# Patient Record
Sex: MALE | Race: BLACK OR AFRICAN AMERICAN | Employment: FULL TIME | URBAN - METROPOLITAN AREA
[De-identification: names, ages, dates, MRNs, and addresses within clinical notes are randomized per-mention and may not be internally consistent; named-entity substitution may affect disease eponyms.]

---

## 2018-01-11 NOTE — TELEPHONE ENCOUNTER
Pt not seen in years. Made a new pt appt for 1/30/2018. Pt reports asthma has been getting triggered easier and ran out of his rescue inhaler he has had for years wants to know if you can send an inhaler to pharmacy to last him to his appt?  Has not had an

## 2018-01-13 RX ORDER — ALBUTEROL SULFATE 90 UG/1
2 AEROSOL, METERED RESPIRATORY (INHALATION) EVERY 6 HOURS PRN
Qty: 1 INHALER | Refills: 0 | Status: SHIPPED | OUTPATIENT
Start: 2018-01-13 | End: 2021-07-08

## 2018-01-13 NOTE — TELEPHONE ENCOUNTER
Called patient - verified patient's name and  - informed pt of doctor's note - patient states it was an albuterol rescue inhaler.     Med order pended - please advise    Routing to [facility] for [facility]

## 2018-01-29 ENCOUNTER — TELEPHONE (OUTPATIENT)
Dept: FAMILY MEDICINE CLINIC | Facility: CLINIC | Age: 43
End: 2018-01-29

## 2018-01-29 NOTE — TELEPHONE ENCOUNTER
FMLA forms received at the [facility] office via fax for pt. Forms emailed to [email or web address], originals left at the [facility] office for [employer].

## 2018-01-30 ENCOUNTER — OFFICE VISIT (OUTPATIENT)
Dept: FAMILY MEDICINE CLINIC | Facility: CLINIC | Age: 43
End: 2018-01-30

## 2018-01-30 ENCOUNTER — LAB ENCOUNTER (OUTPATIENT)
Dept: LAB | Age: 43
End: 2018-01-30
Attending: FAMILY MEDICINE
Payer: COMMERCIAL

## 2018-01-30 VITALS
SYSTOLIC BLOOD PRESSURE: 127 MMHG | RESPIRATION RATE: 16 BRPM | HEIGHT: 67 IN | WEIGHT: 162 LBS | TEMPERATURE: 98 F | HEART RATE: 67 BPM | DIASTOLIC BLOOD PRESSURE: 73 MMHG | BODY MASS INDEX: 25.43 KG/M2

## 2018-01-30 DIAGNOSIS — Z12.5 PROSTATE CANCER SCREENING: ICD-10-CM

## 2018-01-30 DIAGNOSIS — Z13.220 LIPID SCREENING: ICD-10-CM

## 2018-01-30 DIAGNOSIS — Z11.3 SCREEN FOR STD (SEXUALLY TRANSMITTED DISEASE): ICD-10-CM

## 2018-01-30 DIAGNOSIS — R07.82 INTERCOSTAL PAIN: Primary | ICD-10-CM

## 2018-01-30 DIAGNOSIS — M99.01 CERVICOTHORACIC SOMATIC DYSFUNCTION: ICD-10-CM

## 2018-01-30 DIAGNOSIS — J45.20 MILD INTERMITTENT ASTHMA WITHOUT COMPLICATION: ICD-10-CM

## 2018-01-30 DIAGNOSIS — R07.9 CHEST PAIN, UNSPECIFIED TYPE: ICD-10-CM

## 2018-01-30 DIAGNOSIS — Z00.00 PHYSICAL EXAM, ROUTINE: ICD-10-CM

## 2018-01-30 DIAGNOSIS — Z13.29 THYROID DISORDER SCREEN: ICD-10-CM

## 2018-01-30 PROBLEM — R53.82 CHRONIC FATIGUE: Status: ACTIVE | Noted: 2018-01-30

## 2018-01-30 PROBLEM — R07.89 OTHER CHEST PAIN: Status: ACTIVE | Noted: 2018-01-30

## 2018-01-30 LAB
ALBUMIN SERPL BCP-MCNC: 4.1 G/DL (ref 3.5–4.8)
ALBUMIN/GLOB SERPL: 1.2 {RATIO} (ref 1–2)
ALP SERPL-CCNC: 55 U/L (ref 32–100)
ALT SERPL-CCNC: 22 U/L (ref 17–63)
ANION GAP SERPL CALC-SCNC: 9 MMOL/L (ref 0–18)
AST SERPL-CCNC: 23 U/L (ref 15–41)
BACTERIA UR QL AUTO: NEGATIVE /HPF
BASOPHILS # BLD: 0.1 K/UL (ref 0–0.2)
BASOPHILS NFR BLD: 1 %
BILIRUB SERPL-MCNC: 0.5 MG/DL (ref 0.3–1.2)
BILIRUB UR QL: NEGATIVE
BUN SERPL-MCNC: 9 MG/DL (ref 8–20)
BUN/CREAT SERPL: 9.9 (ref 10–20)
CALCIUM SERPL-MCNC: 9.3 MG/DL (ref 8.5–10.5)
CHLORIDE SERPL-SCNC: 97 MMOL/L (ref 95–110)
CHOLEST SERPL-MCNC: 216 MG/DL (ref 110–200)
CLARITY UR: CLEAR
CO2 SERPL-SCNC: 32 MMOL/L (ref 22–32)
COLOR UR: YELLOW
CREAT SERPL-MCNC: 0.91 MG/DL (ref 0.5–1.5)
EOSINOPHIL # BLD: 0.1 K/UL (ref 0–0.7)
EOSINOPHIL NFR BLD: 2 %
ERYTHROCYTE [DISTWIDTH] IN BLOOD BY AUTOMATED COUNT: 14.9 % (ref 11–15)
GLOBULIN PLAS-MCNC: 3.5 G/DL (ref 2.5–3.7)
GLUCOSE SERPL-MCNC: 76 MG/DL (ref 70–99)
GLUCOSE UR-MCNC: NEGATIVE MG/DL
HCT VFR BLD AUTO: 48.6 % (ref 41–52)
HDLC SERPL-MCNC: 49 MG/DL
HGB BLD-MCNC: 15.7 G/DL (ref 13.5–17.5)
HGB UR QL STRIP.AUTO: NEGATIVE
KETONES UR-MCNC: NEGATIVE MG/DL
LDLC SERPL CALC-MCNC: 142 MG/DL (ref 0–99)
LEUKOCYTE ESTERASE UR QL STRIP.AUTO: NEGATIVE
LYMPHOCYTES # BLD: 2.3 K/UL (ref 1–4)
LYMPHOCYTES NFR BLD: 45 %
MCH RBC QN AUTO: 28.5 PG (ref 27–32)
MCHC RBC AUTO-ENTMCNC: 32.3 G/DL (ref 32–37)
MCV RBC AUTO: 88.3 FL (ref 80–100)
MONOCYTES # BLD: 0.4 K/UL (ref 0–1)
MONOCYTES NFR BLD: 8 %
NEUTROPHILS # BLD AUTO: 2.3 K/UL (ref 1.8–7.7)
NEUTROPHILS NFR BLD: 44 %
NITRITE UR QL STRIP.AUTO: NEGATIVE
NONHDLC SERPL-MCNC: 167 MG/DL
OSMOLALITY UR CALC.SUM OF ELEC: 283 MOSM/KG (ref 275–295)
PH UR: 6 [PH] (ref 5–8)
PLATELET # BLD AUTO: 264 K/UL (ref 140–400)
PMV BLD AUTO: 8.7 FL (ref 7.4–10.3)
POTASSIUM SERPL-SCNC: 3.6 MMOL/L (ref 3.3–5.1)
PROT SERPL-MCNC: 7.6 G/DL (ref 5.9–8.4)
PROT UR-MCNC: NEGATIVE MG/DL
PSA SERPL-MCNC: 1.3 NG/ML (ref 0–4)
RBC # BLD AUTO: 5.51 M/UL (ref 4.5–5.9)
RBC #/AREA URNS AUTO: 1 /HPF
SODIUM SERPL-SCNC: 138 MMOL/L (ref 136–144)
SP GR UR STRIP: 1.01 (ref 1–1.03)
TRIGL SERPL-MCNC: 123 MG/DL (ref 1–149)
TSH SERPL-ACNC: 1.04 UIU/ML (ref 0.45–5.33)
UROBILINOGEN UR STRIP-ACNC: <2
VIT C UR-MCNC: 40 MG/DL
WBC # BLD AUTO: 5.2 K/UL (ref 4–11)
WBC #/AREA URNS AUTO: <1 /HPF

## 2018-01-30 PROCEDURE — 87340 HEPATITIS B SURFACE AG IA: CPT

## 2018-01-30 PROCEDURE — 86803 HEPATITIS C AB TEST: CPT

## 2018-01-30 PROCEDURE — 86704 HEP B CORE ANTIBODY TOTAL: CPT

## 2018-01-30 PROCEDURE — 86780 TREPONEMA PALLIDUM: CPT

## 2018-01-30 PROCEDURE — 86706 HEP B SURFACE ANTIBODY: CPT

## 2018-01-30 PROCEDURE — 99396 PREV VISIT EST AGE 40-64: CPT | Performed by: FAMILY MEDICINE

## 2018-01-30 PROCEDURE — 80061 LIPID PANEL: CPT

## 2018-01-30 PROCEDURE — 85025 COMPLETE CBC W/AUTO DIFF WBC: CPT

## 2018-01-30 PROCEDURE — 84443 ASSAY THYROID STIM HORMONE: CPT

## 2018-01-30 PROCEDURE — 36415 COLL VENOUS BLD VENIPUNCTURE: CPT

## 2018-01-30 PROCEDURE — 98927 OSTEOPATH MANJ 5-6 REGIONS: CPT | Performed by: FAMILY MEDICINE

## 2018-01-30 PROCEDURE — 81003 URINALYSIS AUTO W/O SCOPE: CPT

## 2018-01-30 PROCEDURE — 80500 HEPATITIS A B + C PROFILE: CPT

## 2018-01-30 PROCEDURE — 87591 N.GONORRHOEAE DNA AMP PROB: CPT

## 2018-01-30 PROCEDURE — 99214 OFFICE O/P EST MOD 30 MIN: CPT | Performed by: FAMILY MEDICINE

## 2018-01-30 PROCEDURE — 87389 HIV-1 AG W/HIV-1&-2 AB AG IA: CPT

## 2018-01-30 PROCEDURE — 87491 CHLMYD TRACH DNA AMP PROBE: CPT

## 2018-01-30 PROCEDURE — 80053 COMPREHEN METABOLIC PANEL: CPT

## 2018-01-30 PROCEDURE — 93005 ELECTROCARDIOGRAM TRACING: CPT | Performed by: FAMILY MEDICINE

## 2018-01-30 PROCEDURE — 86708 HEPATITIS A ANTIBODY: CPT

## 2018-01-30 PROCEDURE — 93000 ELECTROCARDIOGRAM COMPLETE: CPT | Performed by: FAMILY MEDICINE

## 2018-01-30 NOTE — PATIENT INSTRUCTIONS
OMT done. Consider EKG. Continue with rescue inhaler as needed. Upper body stretches recommended. Patient counseled on the importance of abstinence and if sex occurs of any type condoms should be used every single time.  The reality of unwanted pregnanc

## 2018-01-31 ENCOUNTER — TELEPHONE (OUTPATIENT)
Dept: OTHER | Age: 43
End: 2018-01-31

## 2018-01-31 ENCOUNTER — TELEPHONE (OUTPATIENT)
Dept: FAMILY MEDICINE CLINIC | Facility: CLINIC | Age: 43
End: 2018-01-31

## 2018-01-31 DIAGNOSIS — J45.20 MILD INTERMITTENT ASTHMA WITHOUT COMPLICATION: Primary | ICD-10-CM

## 2018-01-31 DIAGNOSIS — E78.5 DYSLIPIDEMIA: ICD-10-CM

## 2018-01-31 LAB
C TRACH DNA SPEC QL NAA+PROBE: NEGATIVE
HAV AB SER QL IA: NONREACTIVE
HBV CORE AB SERPL QL IA: NONREACTIVE
HBV SURFACE AB SER-ACNC: <3.1 MIU/ML (ref ?–10)
HBV SURFACE AG SERPL QL IA: NONREACTIVE
HBV SURFACE AG SERPL QL IA: NONREACTIVE
HCV AB SERPL QL IA: NONREACTIVE
HIV1+2 AB SERPL QL IA: NONREACTIVE
N GONORRHOEA DNA SPEC QL NAA+PROBE: NEGATIVE
T PALLIDUM AB SER QL: NEGATIVE

## 2018-01-31 NOTE — TELEPHONE ENCOUNTER
Pt was seen yesterday by [facility] and states was advised to call back if no improvement in \"breathing restriction. \" Pt states still having intermittent wheezing (not present currently), intermittent \"breathing restriction\" (hx of asthma; more so with Darnell Tejada

## 2018-01-31 NOTE — PROGRESS NOTES
HPI:    Patient ID: Mayte Matthews is a 40year old male.     40year old AA male here for complete preventive care physical and for status update on any confirmed chronic medical illnesses and follow up on any previous labs or procedures that were suggestive index is 25.37 kg/m². PHYSICAL EXAM:   Physical Exam    Constitutional: He is oriented to person, place, and time. He appears well-developed and well-nourished. He appears distressed.    Patient seems quietly anxious and apprehensive   HENT:   Right Ear: screen  Screened  - TSH W REFLEX TO FREE T4; Future      Orders Placed This Encounter      HIV AG AB Combo [E]      Hepatitis A B + C profile [E]      T Pallidum Screening [place] TREP [E]      CBC W Differential W Platelet [E]      Comp Metabolic Panel (1

## 2018-01-31 NOTE — TELEPHONE ENCOUNTER
FMLA form for Dr. Fatoumata Thomas received in [address]. Logged for processing. [employer] packet emailed to pt '[email or web address]'.  NK

## 2018-01-31 NOTE — TELEPHONE ENCOUNTER
Called patient - verified patient's name and  - informed pt of doctor's note, answered pt's questions, provided contact info for referral and lab for CXR - patient verbalized understanding  Pt asked about lab results from yesterday - advised pt of Dr Rishabh Meza

## 2018-01-31 NOTE — TELEPHONE ENCOUNTER
Pt states first appt with Dr Pieter Villeda not until  3/12  Dr Alycia Montgomery opening not until 2/23       Pt asking if Dr Rosio Hastings can assist with getting sooner appt?  or is it ok to wait?     Darshana Presley he is only off work until 2/12

## 2018-02-02 ENCOUNTER — HOSPITAL ENCOUNTER (OUTPATIENT)
Dept: GENERAL RADIOLOGY | Age: 43
Discharge: HOME | End: 2018-02-02
Attending: FAMILY MEDICINE
Payer: COMMERCIAL

## 2018-02-02 DIAGNOSIS — J45.20 MILD INTERMITTENT ASTHMA WITHOUT COMPLICATION: ICD-10-CM

## 2018-02-02 PROCEDURE — 71046 X-RAY EXAM CHEST 2 VIEWS: CPT | Performed by: FAMILY MEDICINE

## 2018-02-05 ENCOUNTER — TELEPHONE (OUTPATIENT)
Dept: OTHER | Age: 43
End: 2018-02-05

## 2018-02-05 DIAGNOSIS — M47.814 SPONDYLOSIS OF THORACIC REGION WITHOUT MYELOPATHY OR RADICULOPATHY: Primary | ICD-10-CM

## 2018-02-05 NOTE — TELEPHONE ENCOUNTER
I already stated that this is likely noncontributory, however to see that all the way through I put an ortho/spine referral on chart.

## 2018-02-05 NOTE — TELEPHONE ENCOUNTER
Dr. Hien Sosa: please review and advise, patient called and had concerns regarding his CXR (T10-T11) compression deformities that were found. He asked how serious is this, and is there anything he needs to do to f/u with this issue?   He recalls last year h

## 2018-02-07 NOTE — TELEPHONE ENCOUNTER
Please note/advise. Thank you.  Please reply to pool: EM RN TRIAGE    Pt contacted (Name and  verified) and informed of MD message below and the referral information was provided but pt states that he saw the message via [employer] and has an OV appt on

## 2018-02-07 NOTE — TELEPHONE ENCOUNTER
Dr. Carlos Trinidad,  Patient dropped off disability form. Has been off work since 12-26-17. Do you support this and also pt still off work. Please advise.     Phu Messer ([employer])

## 2018-02-08 NOTE — TELEPHONE ENCOUNTER
Pulmonology staff: please see Dr Hattie Neal note. Assist patient in getting sooner appt if possible.

## 2018-02-08 NOTE — TELEPHONE ENCOUNTER
Sorry I forgot to speak with you the other day. I just don't remember him telling me he had been off that long. I honestly don't know what to say about this. He still has specialists to see. Is he back @ work. If he is I'll approve it, but if he is not then he needs to come back in and see me sooner than later.

## 2018-02-08 NOTE — TELEPHONE ENCOUNTER
Pt stts he has not seen any Pulmonologist and needs to be seen for pain w/ deep inhalation & exhalation. He declined appt on 2/14. Sched pt on 2/16 @ 4 pm [facility] w[facility] [address] [facility]. He was given appt time & location. Pt verbalized understanding.  Dr. Abelardo Reyes-

## 2018-02-09 NOTE — TELEPHONE ENCOUNTER
Brief conversation with pt he stated he is still not feeling well enough to return to work. Pt made appt to talk to you 2-23-18. No addl action at this time I will hold onto the form.     Claire Swift

## 2018-02-21 ENCOUNTER — OFFICE VISIT (OUTPATIENT)
Dept: ORTHOPEDICS CLINIC | Facility: CLINIC | Age: 43
End: 2018-02-21

## 2018-02-21 DIAGNOSIS — S29.019A THORACIC MYOFASCIAL STRAIN, INITIAL ENCOUNTER: Primary | ICD-10-CM

## 2018-02-21 PROCEDURE — 99212 OFFICE O/P EST SF 10 MIN: CPT | Performed by: ORTHOPAEDIC SURGERY

## 2018-02-21 PROCEDURE — 99244 OFF/OP CNSLTJ NEW/EST MOD 40: CPT | Performed by: ORTHOPAEDIC SURGERY

## 2018-02-21 NOTE — PROGRESS NOTES
[address], [address]    Patient: 2000 [facility] Record Number: [id number]  Site: [address] [facility], [address] [place] [address],[address], [address]  Referring Physician:  Faisal Ozuna  [facility] VAS Pain Score: 4 /10    Aggravating Factors: Relieving Factors:   · Sitting  · Standing  · walking  · Car rides  · bending · Sitting  · Laying down     Past Treatment Attempted/Patient’s Response:    The patient indicates he requires assistance with the f PSYCHE: n depression, n anxiety. HEMATOLOGY: denies hx anemia; denies bruising or excessive bleeding. ENDOCRINE: denies excessive thirst or urination; denies unexpected wt gain or wt loss.   MUSCULOSKELETAL: DENIES:, Muscle cramps, Joint pain, Swelling of HEAD/NECK: Head is normocephalic  EYES: EOMI, AYAZ  SKIN EXAM: Skin is intact, head, neck, trunk and arms/legs. No rashes, mottling or ulcerations. LYMPH EXAM: There is no lymph edema in either lower extremity.   VASCULAR EXAM:  pulses are normal bilate

## 2018-03-09 ENCOUNTER — OFFICE VISIT (OUTPATIENT)
Dept: PULMONOLOGY | Facility: CLINIC | Age: 43
End: 2018-03-09

## 2018-03-09 VITALS
HEIGHT: 67 IN | SYSTOLIC BLOOD PRESSURE: 117 MMHG | HEART RATE: 76 BPM | WEIGHT: 163.38 LBS | OXYGEN SATURATION: 98 % | DIASTOLIC BLOOD PRESSURE: 78 MMHG | RESPIRATION RATE: 18 BRPM | BODY MASS INDEX: 25.64 KG/M2

## 2018-03-09 DIAGNOSIS — J45.30 MILD PERSISTENT ASTHMA WITHOUT COMPLICATION: Primary | ICD-10-CM

## 2018-03-09 PROCEDURE — 99212 OFFICE O/P EST SF 10 MIN: CPT | Performed by: INTERNAL MEDICINE

## 2018-03-09 PROCEDURE — 99244 OFF/OP CNSLTJ NEW/EST MOD 40: CPT | Performed by: INTERNAL MEDICINE

## 2018-03-09 NOTE — PROGRESS NOTES
[address], [place]    Report of Consultation    Caffie Course Patient Status:  Outpatient    1975 MRN [id number]   Location [place] , [address] [facility], [address] Attending No att. providers found   Hosp Day # 0 PCP Westley Thalia Flor CTA(B)     Slightly distant breath sounds but clear     No rales or rhonchi or wheezes     Heart RRR no G/M   abd benign   Ext normal   No focal deficit   Skin normal     Results:     Laboratory Data:    Lab Results  Component Value Date   WBC 5.2 01/30/20

## 2018-03-14 ENCOUNTER — HOSPITAL ENCOUNTER (OUTPATIENT)
Dept: RESPIRATORY THERAPY | Facility: HOSPITAL | Age: 43
Discharge: HOME | End: 2018-03-14
Attending: INTERNAL MEDICINE
Payer: COMMERCIAL

## 2018-03-14 DIAGNOSIS — J45.30 MILD PERSISTENT ASTHMA WITHOUT COMPLICATION: ICD-10-CM

## 2018-03-14 PROCEDURE — 94729 DIFFUSING CAPACITY: CPT | Performed by: INTERNAL MEDICINE

## 2018-03-14 PROCEDURE — 94726 PLETHYSMOGRAPHY LUNG VOLUMES: CPT | Performed by: INTERNAL MEDICINE

## 2018-03-14 PROCEDURE — 94060 EVALUATION OF WHEEZING: CPT | Performed by: INTERNAL MEDICINE

## 2018-03-16 NOTE — PROCEDURES
[facility]    Patient's Name William Smith MRN [id number]    1975 Pulmonologist Nelson Dinero MD   Location [address] [facility] PCP Lawyer Billy DO     IMPRESSION:    The PFTs are Normal.    No change in jesus

## 2018-03-22 ENCOUNTER — TELEPHONE (OUTPATIENT)
Dept: ADMINISTRATIVE | Age: 43
End: 2018-03-22

## 2018-03-22 NOTE — TELEPHONE ENCOUNTER
Good morning Dr. Angela Thornton,   Pt was off work as of December due to intercostal pain. Filing for retro disability and FMLA. I did not see that you provided him with a note or medical leave. 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

## 2018-03-27 NOTE — TELEPHONE ENCOUNTER
His PFTs is normal  Patient was seen by me first time in office his physical exam was unremarkable  Even though he had asthma, I would not be able to help him on disability paperwork   Thank you

## 2018-04-09 NOTE — TELEPHONE ENCOUNTER
Forms to be scanned not completed.   Disability not approved by providers:   Dr. Fatoumata Thomas or Dr. Abhishek Huitron

## 2020-07-07 ENCOUNTER — OFFICE VISIT (OUTPATIENT)
Dept: FAMILY MEDICINE CLINIC | Facility: CLINIC | Age: 45
End: 2020-07-07
Payer: MEDICAID

## 2020-07-07 ENCOUNTER — LAB ENCOUNTER (OUTPATIENT)
Dept: LAB | Age: 45
End: 2020-07-07
Attending: FAMILY MEDICINE
Payer: MEDICAID

## 2020-07-07 VITALS
SYSTOLIC BLOOD PRESSURE: 128 MMHG | HEIGHT: 67 IN | DIASTOLIC BLOOD PRESSURE: 83 MMHG | WEIGHT: 172 LBS | RESPIRATION RATE: 17 BRPM | BODY MASS INDEX: 27 KG/M2 | HEART RATE: 79 BPM

## 2020-07-07 DIAGNOSIS — Z12.5 PROSTATE CANCER SCREENING: ICD-10-CM

## 2020-07-07 DIAGNOSIS — Z00.00 ENCOUNTER FOR PREVENTIVE CARE: ICD-10-CM

## 2020-07-07 DIAGNOSIS — J45.40 MODERATE PERSISTENT ASTHMA WITHOUT COMPLICATION: ICD-10-CM

## 2020-07-07 DIAGNOSIS — Z11.3 ROUTINE SCREENING FOR STI (SEXUALLY TRANSMITTED INFECTION): ICD-10-CM

## 2020-07-07 DIAGNOSIS — Z13.220 LIPID SCREENING: ICD-10-CM

## 2020-07-07 DIAGNOSIS — Z13.29 THYROID DISORDER SCREEN: ICD-10-CM

## 2020-07-07 DIAGNOSIS — R68.82 DECREASED LIBIDO: ICD-10-CM

## 2020-07-07 DIAGNOSIS — Z00.00 ENCOUNTER FOR PREVENTIVE CARE: Primary | ICD-10-CM

## 2020-07-07 DIAGNOSIS — E55.9 VITAMIN D INSUFFICIENCY: ICD-10-CM

## 2020-07-07 DIAGNOSIS — R94.31 ABNORMAL FINDING ON EKG: ICD-10-CM

## 2020-07-07 LAB
ALBUMIN SERPL-MCNC: 4.1 G/DL (ref 3.4–5)
ALBUMIN/GLOB SERPL: 1 {RATIO} (ref 1–2)
ALP LIVER SERPL-CCNC: 58 U/L (ref 45–117)
ALT SERPL-CCNC: 36 U/L (ref 16–61)
ANION GAP SERPL CALC-SCNC: 5 MMOL/L (ref 0–18)
AST SERPL-CCNC: 27 U/L (ref 15–37)
BACTERIA UR QL AUTO: NEGATIVE /HPF
BASOPHILS # BLD AUTO: 0.02 X10(3) UL (ref 0–0.2)
BASOPHILS NFR BLD AUTO: 0.4 %
BILIRUB SERPL-MCNC: 0.5 MG/DL (ref 0.1–2)
BILIRUB UR QL: NEGATIVE
BUN BLD-MCNC: 16 MG/DL (ref 7–18)
BUN/CREAT SERPL: 14.2 (ref 10–20)
CALCIUM BLD-MCNC: 9.5 MG/DL (ref 8.5–10.1)
CHLORIDE SERPL-SCNC: 104 MMOL/L (ref 98–112)
CHOLEST SMN-MCNC: 228 MG/DL (ref ?–200)
CLARITY UR: CLEAR
CO2 SERPL-SCNC: 32 MMOL/L (ref 21–32)
COLOR UR: YELLOW
COMPLEXED PSA SERPL-MCNC: 1.33 NG/ML (ref ?–4)
CREAT BLD-MCNC: 1.13 MG/DL (ref 0.7–1.3)
DEPRECATED RDW RBC AUTO: 45.6 FL (ref 35.1–46.3)
EOSINOPHIL # BLD AUTO: 0.13 X10(3) UL (ref 0–0.7)
EOSINOPHIL NFR BLD AUTO: 2.7 %
ERYTHROCYTE [DISTWIDTH] IN BLOOD BY AUTOMATED COUNT: 14.2 % (ref 11–15)
GLOBULIN PLAS-MCNC: 4 G/DL (ref 2.8–4.4)
GLUCOSE BLD-MCNC: 72 MG/DL (ref 70–99)
GLUCOSE UR-MCNC: NEGATIVE MG/DL
HAV AB SER QL IA: NONREACTIVE
HBV CORE AB SERPL QL IA: NONREACTIVE
HBV SURFACE AB SER QL: NONREACTIVE
HBV SURFACE AB SERPL IA-ACNC: <3.1 MIU/ML
HBV SURFACE AG SERPL QL IA: NONREACTIVE
HCT VFR BLD AUTO: 47.8 % (ref 39–53)
HCV AB SERPL QL IA: NONREACTIVE
HDLC SERPL-MCNC: 59 MG/DL (ref 40–59)
HGB BLD-MCNC: 16 G/DL (ref 13–17.5)
HGB UR QL STRIP.AUTO: NEGATIVE
IMM GRANULOCYTES # BLD AUTO: 0.01 X10(3) UL (ref 0–1)
IMM GRANULOCYTES NFR BLD: 0.2 %
KETONES UR-MCNC: NEGATIVE MG/DL
LDLC SERPL CALC-MCNC: 147 MG/DL (ref ?–100)
LEUKOCYTE ESTERASE UR QL STRIP.AUTO: NEGATIVE
LYMPHOCYTES # BLD AUTO: 2.22 X10(3) UL (ref 1–4)
LYMPHOCYTES NFR BLD AUTO: 45.7 %
M PROTEIN MFR SERPL ELPH: 8.1 G/DL (ref 6.4–8.2)
MCH RBC QN AUTO: 29.3 PG (ref 26–34)
MCHC RBC AUTO-ENTMCNC: 33.5 G/DL (ref 31–37)
MCV RBC AUTO: 87.5 FL (ref 80–100)
MONOCYTES # BLD AUTO: 0.41 X10(3) UL (ref 0.1–1)
MONOCYTES NFR BLD AUTO: 8.4 %
NEUTROPHILS # BLD AUTO: 2.07 X10 (3) UL (ref 1.5–7.7)
NEUTROPHILS # BLD AUTO: 2.07 X10(3) UL (ref 1.5–7.7)
NEUTROPHILS NFR BLD AUTO: 42.6 %
NITRITE UR QL STRIP.AUTO: NEGATIVE
NONHDLC SERPL-MCNC: 169 MG/DL (ref ?–130)
OSMOLALITY SERPL CALC.SUM OF ELEC: 292 MOSM/KG (ref 275–295)
PATIENT FASTING Y/N/NP: NO
PATIENT FASTING Y/N/NP: NO
PH UR: 5 [PH] (ref 5–8)
PLATELET # BLD AUTO: 298 10(3)UL (ref 150–450)
POTASSIUM SERPL-SCNC: 3.9 MMOL/L (ref 3.5–5.1)
PROT UR-MCNC: NEGATIVE MG/DL
RBC # BLD AUTO: 5.46 X10(6)UL (ref 4.3–5.7)
RBC #/AREA URNS AUTO: 1 /HPF
SODIUM SERPL-SCNC: 141 MMOL/L (ref 136–145)
SP GR UR STRIP: 1.03 (ref 1–1.03)
TRIGL SERPL-MCNC: 110 MG/DL (ref 30–149)
TSI SER-ACNC: 0.88 MIU/ML (ref 0.36–3.74)
UROBILINOGEN UR STRIP-ACNC: <2
VLDLC SERPL CALC-MCNC: 22 MG/DL (ref 0–30)
WBC # BLD AUTO: 4.9 X10(3) UL (ref 4–11)
WBC #/AREA URNS AUTO: 1 /HPF

## 2020-07-07 PROCEDURE — 86706 HEP B SURFACE ANTIBODY: CPT

## 2020-07-07 PROCEDURE — 87340 HEPATITIS B SURFACE AG IA: CPT

## 2020-07-07 PROCEDURE — 86780 TREPONEMA PALLIDUM: CPT

## 2020-07-07 PROCEDURE — 93000 ELECTROCARDIOGRAM COMPLETE: CPT | Performed by: FAMILY MEDICINE

## 2020-07-07 PROCEDURE — 82306 VITAMIN D 25 HYDROXY: CPT

## 2020-07-07 PROCEDURE — 80500 HEPATITIS A B + C PROFILE: CPT

## 2020-07-07 PROCEDURE — 81001 URINALYSIS AUTO W/SCOPE: CPT

## 2020-07-07 PROCEDURE — 87491 CHLMYD TRACH DNA AMP PROBE: CPT

## 2020-07-07 PROCEDURE — 87389 HIV-1 AG W/HIV-1&-2 AB AG IA: CPT

## 2020-07-07 PROCEDURE — 36415 COLL VENOUS BLD VENIPUNCTURE: CPT

## 2020-07-07 PROCEDURE — 84402 ASSAY OF FREE TESTOSTERONE: CPT

## 2020-07-07 PROCEDURE — 86803 HEPATITIS C AB TEST: CPT

## 2020-07-07 PROCEDURE — 80053 COMPREHEN METABOLIC PANEL: CPT

## 2020-07-07 PROCEDURE — 99396 PREV VISIT EST AGE 40-64: CPT | Performed by: FAMILY MEDICINE

## 2020-07-07 PROCEDURE — 86708 HEPATITIS A ANTIBODY: CPT

## 2020-07-07 PROCEDURE — 85025 COMPLETE CBC W/AUTO DIFF WBC: CPT

## 2020-07-07 PROCEDURE — 99214 OFFICE O/P EST MOD 30 MIN: CPT | Performed by: FAMILY MEDICINE

## 2020-07-07 PROCEDURE — 87591 N.GONORRHOEAE DNA AMP PROB: CPT

## 2020-07-07 PROCEDURE — 86704 HEP B CORE ANTIBODY TOTAL: CPT

## 2020-07-07 PROCEDURE — 84443 ASSAY THYROID STIM HORMONE: CPT

## 2020-07-07 PROCEDURE — 84403 ASSAY OF TOTAL TESTOSTERONE: CPT

## 2020-07-07 PROCEDURE — 80061 LIPID PANEL: CPT

## 2020-07-07 RX ORDER — ALBUTEROL SULFATE 2.5 MG/3ML
2.5 SOLUTION RESPIRATORY (INHALATION) EVERY 4 HOURS PRN
Qty: 75 ML | Refills: 3 | Status: SHIPPED | OUTPATIENT
Start: 2020-07-07 | End: 2021-07-07

## 2020-07-07 RX ORDER — ALBUTEROL SULFATE 90 UG/1
2 AEROSOL, METERED RESPIRATORY (INHALATION) EVERY 6 HOURS PRN
Qty: 2 INHALER | Refills: 2 | Status: SHIPPED | OUTPATIENT
Start: 2020-07-07

## 2020-07-07 NOTE — PATIENT INSTRUCTIONS
All adult screening ordered and done appropriate for patient's age and gender and risk factors and complaints. Patient counseled on the importance of abstinence and if sex occurs of any type condoms should be used every single time.  The reality of unwante

## 2020-07-07 NOTE — PROGRESS NOTES
HPI:    Patient ID: Rosamaria Harrison is a 41year old male.     This patient is a 50-year-old -American gentleman here for complete preventive care physical and for status update on any confirmed chronic medical illnesses and follow up on any previous la Tympanic membrane and ear canal normal.   Left Ear: Tympanic membrane and ear canal normal.   Nose: Nose normal.   Mouth/Throat: Oropharynx is clear and moist.   Neck: No thyromegaly present. Cardiovascular: Normal rate and regular rhythm.     Pulmonary/C Refill: 3  - EXT DME NEBULIZER     8. Vitamin D insufficiency  Ordered. Vitamin D3 supplementation recommended to the patient.   - VITAMIN D, 25-HYDROXY; Future    Orders Placed This Encounter      Lipid Panel [E]      CBC W Differential W Platelet [E]

## 2020-07-08 LAB
25(OH)D3 SERPL-MCNC: 37.4 NG/ML (ref 30–100)
C TRACH DNA SPEC QL NAA+PROBE: NEGATIVE
N GONORRHOEA DNA SPEC QL NAA+PROBE: NEGATIVE
T PALLIDUM AB SER QL: NEGATIVE

## 2020-07-19 LAB
TESTOSTERONE, FREE, S: 7.56 NG/DL
TESTOSTERONE, TOTAL, S: 540 NG/DL

## 2020-07-28 ENCOUNTER — TELEPHONE (OUTPATIENT)
Dept: FAMILY MEDICINE CLINIC | Facility: CLINIC | Age: 45
End: 2020-07-28

## 2021-07-08 ENCOUNTER — OFFICE VISIT (OUTPATIENT)
Dept: FAMILY MEDICINE CLINIC | Facility: CLINIC | Age: 46
End: 2021-07-08
Payer: MEDICAID

## 2021-07-08 ENCOUNTER — LAB ENCOUNTER (OUTPATIENT)
Dept: LAB | Age: 46
End: 2021-07-08
Attending: FAMILY MEDICINE
Payer: MEDICAID

## 2021-07-08 VITALS
HEIGHT: 67 IN | WEIGHT: 174 LBS | DIASTOLIC BLOOD PRESSURE: 74 MMHG | TEMPERATURE: 97 F | RESPIRATION RATE: 17 BRPM | BODY MASS INDEX: 27.31 KG/M2 | HEART RATE: 78 BPM | SYSTOLIC BLOOD PRESSURE: 112 MMHG

## 2021-07-08 DIAGNOSIS — Z00.00 ROUTINE PHYSICAL EXAMINATION: Primary | ICD-10-CM

## 2021-07-08 DIAGNOSIS — Z00.00 ROUTINE PHYSICAL EXAMINATION: ICD-10-CM

## 2021-07-08 LAB
ALBUMIN SERPL-MCNC: 3.7 G/DL (ref 3.4–5)
ALBUMIN/GLOB SERPL: 0.9 {RATIO} (ref 1–2)
ALP LIVER SERPL-CCNC: 60 U/L
ALT SERPL-CCNC: 20 U/L
ANION GAP SERPL CALC-SCNC: 5 MMOL/L (ref 0–18)
AST SERPL-CCNC: 16 U/L (ref 15–37)
BASOPHILS # BLD AUTO: 0.03 X10(3) UL (ref 0–0.2)
BASOPHILS NFR BLD AUTO: 0.6 %
BILIRUB SERPL-MCNC: 0.4 MG/DL (ref 0.1–2)
BILIRUB UR QL: NEGATIVE
BUN BLD-MCNC: 13 MG/DL (ref 7–18)
BUN/CREAT SERPL: 13.8 (ref 10–20)
CALCIUM BLD-MCNC: 9.4 MG/DL (ref 8.5–10.1)
CHLORIDE SERPL-SCNC: 107 MMOL/L (ref 98–112)
CHOLEST SMN-MCNC: 222 MG/DL (ref ?–200)
CLARITY UR: CLEAR
CO2 SERPL-SCNC: 29 MMOL/L (ref 21–32)
COLOR UR: YELLOW
COMPLEXED PSA SERPL-MCNC: 1.03 NG/ML (ref ?–4)
CREAT BLD-MCNC: 0.94 MG/DL
DEPRECATED RDW RBC AUTO: 45.5 FL (ref 35.1–46.3)
EOSINOPHIL # BLD AUTO: 0.17 X10(3) UL (ref 0–0.7)
EOSINOPHIL NFR BLD AUTO: 3.6 %
ERYTHROCYTE [DISTWIDTH] IN BLOOD BY AUTOMATED COUNT: 14.1 % (ref 11–15)
GLOBULIN PLAS-MCNC: 3.9 G/DL (ref 2.8–4.4)
GLUCOSE BLD-MCNC: 81 MG/DL (ref 70–99)
GLUCOSE UR-MCNC: NEGATIVE MG/DL
HCT VFR BLD AUTO: 47.3 %
HDLC SERPL-MCNC: 51 MG/DL (ref 40–59)
HGB BLD-MCNC: 15.4 G/DL
HGB UR QL STRIP.AUTO: NEGATIVE
IMM GRANULOCYTES # BLD AUTO: 0 X10(3) UL (ref 0–1)
IMM GRANULOCYTES NFR BLD: 0 %
KETONES UR-MCNC: NEGATIVE MG/DL
LDLC SERPL CALC-MCNC: 156 MG/DL (ref ?–100)
LEUKOCYTE ESTERASE UR QL STRIP.AUTO: NEGATIVE
LYMPHOCYTES # BLD AUTO: 2.44 X10(3) UL (ref 1–4)
LYMPHOCYTES NFR BLD AUTO: 51.4 %
M PROTEIN MFR SERPL ELPH: 7.6 G/DL (ref 6.4–8.2)
MCH RBC QN AUTO: 28.6 PG (ref 26–34)
MCHC RBC AUTO-ENTMCNC: 32.6 G/DL (ref 31–37)
MCV RBC AUTO: 87.9 FL
MONOCYTES # BLD AUTO: 0.34 X10(3) UL (ref 0.1–1)
MONOCYTES NFR BLD AUTO: 7.2 %
NEUTROPHILS # BLD AUTO: 1.77 X10 (3) UL (ref 1.5–7.7)
NEUTROPHILS # BLD AUTO: 1.77 X10(3) UL (ref 1.5–7.7)
NEUTROPHILS NFR BLD AUTO: 37.2 %
NITRITE UR QL STRIP.AUTO: NEGATIVE
NONHDLC SERPL-MCNC: 171 MG/DL (ref ?–130)
OSMOLALITY SERPL CALC.SUM OF ELEC: 291 MOSM/KG (ref 275–295)
PATIENT FASTING Y/N/NP: NO
PATIENT FASTING Y/N/NP: NO
PH UR: 6 [PH] (ref 5–8)
PLATELET # BLD AUTO: 266 10(3)UL (ref 150–450)
POTASSIUM SERPL-SCNC: 4.4 MMOL/L (ref 3.5–5.1)
PROT UR-MCNC: NEGATIVE MG/DL
RBC # BLD AUTO: 5.38 X10(6)UL
SODIUM SERPL-SCNC: 141 MMOL/L (ref 136–145)
SP GR UR STRIP: 1.02 (ref 1–1.03)
TRIGL SERPL-MCNC: 84 MG/DL (ref 30–149)
TSI SER-ACNC: 0.5 MIU/ML (ref 0.36–3.74)
UROBILINOGEN UR STRIP-ACNC: <2
VLDLC SERPL CALC-MCNC: 16 MG/DL (ref 0–30)
WBC # BLD AUTO: 4.8 X10(3) UL (ref 4–11)

## 2021-07-08 PROCEDURE — 3074F SYST BP LT 130 MM HG: CPT | Performed by: FAMILY MEDICINE

## 2021-07-08 PROCEDURE — 3008F BODY MASS INDEX DOCD: CPT | Performed by: FAMILY MEDICINE

## 2021-07-08 PROCEDURE — 85025 COMPLETE CBC W/AUTO DIFF WBC: CPT

## 2021-07-08 PROCEDURE — 81003 URINALYSIS AUTO W/O SCOPE: CPT

## 2021-07-08 PROCEDURE — 3078F DIAST BP <80 MM HG: CPT | Performed by: FAMILY MEDICINE

## 2021-07-08 PROCEDURE — 99396 PREV VISIT EST AGE 40-64: CPT | Performed by: FAMILY MEDICINE

## 2021-07-08 PROCEDURE — 80061 LIPID PANEL: CPT

## 2021-07-08 PROCEDURE — 80053 COMPREHEN METABOLIC PANEL: CPT

## 2021-07-08 PROCEDURE — 84443 ASSAY THYROID STIM HORMONE: CPT

## 2021-07-08 PROCEDURE — 36415 COLL VENOUS BLD VENIPUNCTURE: CPT

## 2021-07-08 NOTE — PATIENT INSTRUCTIONS
All adult screening ordered and done appropriate for patient's age and gender and risk factors and complaints. Encouraged physical fitness and daily physical activity daily. Monitor blood pressures and record at home. Limit salt intake.

## 2021-07-08 NOTE — PROGRESS NOTES
HPI/Subjective:   Patient ID: Dina Tristan is a 40year old male.     This is a 56-year-old -American male here for complete preventive care physical and for status update on any confirmed chronic medical illnesses and follow up on any previous labs examination  General well exam the following have been ordered. Echocardiogram order secondary to findings on previously done EKG. - CARD ECHO 2D DOPPLER CONTRAST (CPT=93306); Future  - LIPID PANEL;  Future  - TSH W REFLEX TO FREE T4; Future  - COMP METAB

## 2021-08-03 ENCOUNTER — HOSPITAL ENCOUNTER (OUTPATIENT)
Dept: CV DIAGNOSTICS | Facility: HOSPITAL | Age: 46
Discharge: HOME | End: 2021-08-03
Attending: FAMILY MEDICINE
Payer: MEDICAID

## 2021-08-03 DIAGNOSIS — Z00.00 ROUTINE PHYSICAL EXAMINATION: ICD-10-CM

## 2021-08-03 PROCEDURE — 93306 TTE W/DOPPLER COMPLETE: CPT | Performed by: FAMILY MEDICINE

## 2021-08-04 ENCOUNTER — PATIENT MESSAGE (OUTPATIENT)
Dept: FAMILY MEDICINE CLINIC | Facility: CLINIC | Age: 46
End: 2021-08-04

## 2021-08-04 DIAGNOSIS — E78.00 ELEVATED CHOLESTEROL: Primary | ICD-10-CM

## 2021-08-04 NOTE — TELEPHONE ENCOUNTER
From: Toro Arriaga  To: Army Makayla DO  Sent: 8/4/2021 11:25 AM CDT  Subject: Test Results Question    Hi, I don’t see the results for my blood work. Please let me know if this information is available. Thank you!

## 2021-11-11 ENCOUNTER — OFFICE VISIT (OUTPATIENT)
Dept: FAMILY MEDICINE CLINIC | Facility: CLINIC | Age: 46
End: 2021-11-11
Payer: MEDICAID

## 2021-11-11 VITALS
SYSTOLIC BLOOD PRESSURE: 117 MMHG | WEIGHT: 186 LBS | HEART RATE: 79 BPM | HEIGHT: 67 IN | BODY MASS INDEX: 29.19 KG/M2 | DIASTOLIC BLOOD PRESSURE: 76 MMHG | TEMPERATURE: 98 F

## 2021-11-11 DIAGNOSIS — M99.03 LUMBAR REGION SOMATIC DYSFUNCTION: ICD-10-CM
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
## 2021-11-11 DIAGNOSIS — E78.5 HYPERLIPIDEMIA, UNSPECIFIED HYPERLIPIDEMIA TYPE: ICD-10-CM

## 2021-11-11 DIAGNOSIS — S13.4XXD WHIPLASH INJURY TO NECK, SUBSEQUENT ENCOUNTER: ICD-10-CM

## 2021-11-11 DIAGNOSIS — Z11.3 ROUTINE SCREENING FOR STI (SEXUALLY TRANSMITTED INFECTION): Primary | ICD-10-CM

## 2021-11-11 DIAGNOSIS — M99.01 CERVICOTHORACIC SOMATIC DYSFUNCTION: ICD-10-CM

## 2021-11-11 PROCEDURE — 3008F BODY MASS INDEX DOCD: CPT | Performed by: FAMILY MEDICINE

## 2021-11-11 PROCEDURE — 36415 COLL VENOUS BLD VENIPUNCTURE: CPT | Performed by: FAMILY MEDICINE

## 2021-11-11 PROCEDURE — 3074F SYST BP LT 130 MM HG: CPT | Performed by: FAMILY MEDICINE

## 2021-11-11 PROCEDURE — 3078F DIAST BP <80 MM HG: CPT | Performed by: FAMILY MEDICINE

## 2021-11-11 PROCEDURE — 99214 OFFICE O/P EST MOD 30 MIN: CPT | Performed by: FAMILY MEDICINE

## 2021-11-11 PROCEDURE — 98926 OSTEOPATH MANJ 3-4 REGIONS: CPT | Performed by: FAMILY MEDICINE

## 2021-11-11 RX ORDER — CYCLOBENZAPRINE HCL 5 MG
5 TABLET ORAL NIGHTLY
Qty: 30 TABLET | Refills: 0 | Status: SHIPPED | OUTPATIENT
Start: 2021-11-11

## 2021-11-11 RX ORDER — METHYLPREDNISOLONE 4 MG/1
TABLET ORAL
Qty: 1 EACH | Refills: 0 | Status: SHIPPED | OUTPATIENT
Start: 2021-11-11

## 2021-11-11 NOTE — PROGRESS NOTES
Subjective:   Patient ID: Nelia Kelly is a 40year old male.     This patient is a 16-year-old -American male who presents to the clinic for follow-up on elevated lipid status seen in his last preventive care physical.  Additionally the patient want taking: Reported on 11/11/2021) 1 Inhaler 5     Allergies:No Known Allergies    Objective:     11/11/21  0805   BP: 117/76   Pulse: 79   Temp: 97.5 °F (36.4 °C)       Physical Exam  Constitutional:       Appearance: He is not ill-appearing.    HENT:      He Dispense: 30 tablet; Refill: 0    4. Cervicothoracic somatic dysfunction  See #3.  - OSTEOPATHIC MANIP,3-4 BODY REGN  - methylPREDNISolone (MEDROL) 4 MG Oral Tablet Therapy Pack; As directed. Dispense: 1 each;  Refill: 0  - cyclobenzaprine 5 MG Oral Tab; T arched or sagging). Keep your ears in line with your shoulders. Hold for a few seconds before starting the exercise:  1. Tighten your belly muscles (core) and raise 1 arm straight in front of you, palm down. Hold for 5 seconds, then lower. Repeat 5 times. reviewed this educational content on 7/1/2020  © 4845-1094 [employer]. All rights reserved. This information is not intended as a substitute for professional medical care. Always follow your healthcare professional's instructions.         Itzel This can stretch or tear muscles called a strain. It can also stretch or tear ligaments called a sprain. Either of these can cause neck pain. Sometimes neck pain occurs after a simple awkward movement.  In either case, muscle spasm is commonly present and c If your symptoms don’t improve or they get worse, talk with your healthcare provider. You may need a repeat X-ray or other tests. If X-rays were taken, you will be told of any new findings that may affect your care.   Call 911  Call 911 if you have:  · Neck

## 2021-11-11 NOTE — PATIENT INSTRUCTIONS
Osteopathic manipulation performed in the cervical thoracic and lumbar region. We may consider a prescription for muscle relaxation to be taken at night only. STI screen by request.  Asymptomatic. No known contacts. Recheck lipid status.     Quadruped A behind your shoulders:   · With your palms facing the ceiling, turn your fingers inward. You can also do this exercise holding weights if directed by your healthcare provider or physical therapist.  · Take a deep breath.  Breathe out and lower your elbows t program.  Duong Moyer last reviewed this educational content on 7/1/2020  © 4960-7811 [employer]. All rights reserved. This information is not intended as a substitute for professional medical care.  Always follow your healthcare professional's soft cervical collar was prescribed, only ear it for periods of increased pain. It should not be worn for more than 3 hours a day, or for longer than 1 to 2 weeks. Follow-up care  Follow up with your healthcare provider, or as directed.  Physical therapy m

## 2021-11-29 ENCOUNTER — OFFICE VISIT (OUTPATIENT)
Dept: FAMILY MEDICINE CLINIC | Facility: CLINIC | Age: 46
End: 2021-11-29
Payer: MEDICAID

## 2021-11-29 VITALS
BODY MASS INDEX: 27.78 KG/M2 | WEIGHT: 177 LBS | HEIGHT: 67 IN | HEART RATE: 81 BPM | SYSTOLIC BLOOD PRESSURE: 120 MMHG | DIASTOLIC BLOOD PRESSURE: 82 MMHG

## 2021-11-29 DIAGNOSIS — M79.644 FINGER PAIN, RIGHT: ICD-10-CM

## 2021-11-29 DIAGNOSIS — M99.03 SOMATIC DYSFUNCTION OF LUMBOSACRAL REGION: ICD-10-CM

## 2021-11-29 DIAGNOSIS — M99.01 CERVICOTHORACIC SOMATIC DYSFUNCTION: Primary | ICD-10-CM
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
## 2021-11-29 PROCEDURE — 98926 OSTEOPATH MANJ 3-4 REGIONS: CPT | Performed by: FAMILY MEDICINE

## 2021-11-29 PROCEDURE — 3008F BODY MASS INDEX DOCD: CPT | Performed by: FAMILY MEDICINE

## 2021-11-29 PROCEDURE — 99214 OFFICE O/P EST MOD 30 MIN: CPT | Performed by: FAMILY MEDICINE

## 2021-11-29 PROCEDURE — 3074F SYST BP LT 130 MM HG: CPT | Performed by: FAMILY MEDICINE

## 2021-11-29 PROCEDURE — 3079F DIAST BP 80-89 MM HG: CPT | Performed by: FAMILY MEDICINE

## 2021-11-29 NOTE — PATIENT INSTRUCTIONS
OMT done. Patient encouraged to continue with passive stretching. If the symptoms persist then physical therapy may be of some benefit. Physiatry specialist is also an option.   Patient also might benefit from going to a chiropractic office for other latanya

## 2021-11-29 NOTE — PROGRESS NOTES
Subjective:   Patient ID: Issac Modi is a 40year old male.     This patient is a 71-year-old -American male who presents to the clinic for follow-up on elevated lipid status seen in his last preventive care physical.  Additionally the patient want years ago while playing flag football. It was a difficult reset per the patient when he went to the emergency room.   He never did follow-up with a hand specialist in orthopedics and now he is beginning to have soft tissue swelling and decreased range of m thoracic and lumbosacral regions with minimal to moderate release in the thoracic region, but none apparent in the other regions.  - OSTEOPATHIC MANIP,3-4 BODY REGN    2.  Somatic dysfunction of lumbosacral region  See #1.  - OSTEOPATHIC MANIP,3-4 BODY REGN

## 2021-12-09 ENCOUNTER — HOSPITAL ENCOUNTER (OUTPATIENT)
Dept: GENERAL RADIOLOGY | Facility: HOSPITAL | Age: 46
Discharge: HOME | End: 2021-12-09
Attending: PLASTIC SURGERY
Payer: MEDICAID

## 2021-12-09 ENCOUNTER — OFFICE VISIT (OUTPATIENT)
Dept: SURGERY | Facility: CLINIC | Age: 46
End: 2021-12-09
Payer: MEDICAID

## 2021-12-09 DIAGNOSIS — M25.641 FINGER STIFFNESS, RIGHT: ICD-10-CM

## 2021-12-09 DIAGNOSIS — R52 PAIN: ICD-10-CM

## 2021-12-09 DIAGNOSIS — R52 PAIN: Primary | ICD-10-CM

## 2021-12-09 PROCEDURE — 73130 X-RAY EXAM OF HAND: CPT | Performed by: PLASTIC SURGERY

## 2021-12-09 PROCEDURE — 99243 OFF/OP CNSLTJ NEW/EST LOW 30: CPT | Performed by: PLASTIC SURGERY

## 2021-12-09 NOTE — H&P
Fátima Pugh is a 40year old male that presents with Patient presents with:  Pain: Right middle finger   .     REFERRED BY:  Maurine Bloch    Pacemaker: No  Latex Allergy: no  Coumadin: No  Plavix: No  Other anticoagulants: No  Cardiac stents: No    H History:   Procedure Laterality Date   • HERNIA SURGERY          ALLERIGIES  No Known Allergies     MEDICATIONS  Current Outpatient Medications   Medication Sig Dispense Refill   • methylPREDNISolone (MEDROL) 4 MG Oral Tablet Therapy Pack As directed.  1 ea Female

## 2021-12-16 ENCOUNTER — OFFICE VISIT (OUTPATIENT)
Dept: SURGERY | Facility: CLINIC | Age: 46
End: 2021-12-16
Payer: MEDICAID

## 2021-12-16 DIAGNOSIS — M79.601 PAIN OF RIGHT UPPER EXTREMITY: Primary | ICD-10-CM
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
## 2021-12-29 ENCOUNTER — OFFICE VISIT (OUTPATIENT)
Dept: PHYSICAL THERAPY | Age: 46
End: 2021-12-29
Attending: FAMILY MEDICINE
Payer: MEDICAID
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
## 2021-12-29 PROCEDURE — 97162 PT EVAL MOD COMPLEX 30 MIN: CPT | Performed by: PHYSICAL THERAPIST

## 2021-12-29 PROCEDURE — 97110 THERAPEUTIC EXERCISES: CPT | Performed by: PHYSICAL THERAPIST

## 2021-12-29 NOTE — PATIENT INSTRUCTIONS
Home exercise program instruction:  Cervical retraction with/without self OP x 10 reps in sitting and/or supine position 4-5x/day;  Sitting posture correction using a lumbar roll;  Supinelying posture correction without a pillow but with/without ce Quality 474: Zoster Vaccination Status: Shingrix Vaccination Administered or Previously Received Quality 130: Documentation Of Current Medications In The Medical Record: Current Medications Documented Quality 110: Preventive Care And Screening: Influenza Immunization: Influenza Immunization previously received during influenza season Quality 226: Preventive Care And Screening: Tobacco Use: Screening And Cessation Intervention: Patient screened for tobacco use and is an ex/non-smoker Detail Level: Detailed Quality 131: Pain Assessment And Follow-Up: Pain assessment using a standardized tool is documented as negative, no follow-up plan required Quality 111:Pneumonia Vaccination Status For Older Adults: Pneumococcal Vaccination Previously Received Quality 400a: One-Time Screening For Hepatitis C Virus (Hcv) For All Patients: Patient received one-time screening for HCV infection Quality 431: Preventive Care And Screening: Unhealthy Alcohol Use - Screening: Patient screened for unhealthy alcohol use using a single question and scores less than 2 times per year
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
## 2022-01-05 ENCOUNTER — OFFICE VISIT (OUTPATIENT)
Dept: PHYSICAL THERAPY | Age: 47
End: 2022-01-05
Attending: FAMILY MEDICINE
Payer: MEDICAID

## 2022-01-05 PROCEDURE — 97110 THERAPEUTIC EXERCISES: CPT | Performed by: PHYSICAL THERAPIST
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
## 2022-01-07 ENCOUNTER — APPOINTMENT (OUTPATIENT)
Dept: PHYSICAL THERAPY | Age: 47
End: 2022-01-07
Attending: FAMILY MEDICINE
Payer: MEDICAID

## 2022-01-07 ENCOUNTER — OFFICE VISIT (OUTPATIENT)
Dept: PHYSICAL THERAPY | Age: 47
End: 2022-01-07
Attending: FAMILY MEDICINE
Payer: MEDICAID

## 2022-01-07 PROCEDURE — 97110 THERAPEUTIC EXERCISES: CPT | Performed by: PHYSICAL THERAPIST
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
## 2022-01-11 ENCOUNTER — OFFICE VISIT (OUTPATIENT)
Dept: PHYSICAL THERAPY | Age: 47
End: 2022-01-11
Attending: FAMILY MEDICINE
Payer: MEDICAID

## 2022-01-11 PROCEDURE — 97110 THERAPEUTIC EXERCISES: CPT | Performed by: PHYSICAL THERAPIST
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
## 2022-01-12 ENCOUNTER — APPOINTMENT (OUTPATIENT)
Dept: PHYSICAL THERAPY | Age: 47
End: 2022-01-12
Attending: FAMILY MEDICINE
Payer: MEDICAID

## 2022-01-14 ENCOUNTER — APPOINTMENT (OUTPATIENT)
Dept: PHYSICAL THERAPY | Age: 47
End: 2022-01-14
Attending: FAMILY MEDICINE
Payer: MEDICAID

## 2022-01-14 ENCOUNTER — OFFICE VISIT (OUTPATIENT)
Dept: PHYSICAL THERAPY | Age: 47
End: 2022-01-14
Attending: FAMILY MEDICINE
Payer: MEDICAID

## 2022-01-14 PROCEDURE — 97110 THERAPEUTIC EXERCISES: CPT | Performed by: PHYSICAL THERAPIST
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
## 2022-01-18 ENCOUNTER — OFFICE VISIT (OUTPATIENT)
Dept: PHYSICAL THERAPY | Age: 47
End: 2022-01-18
Attending: FAMILY MEDICINE
Payer: MEDICAID

## 2022-01-18 PROCEDURE — 97110 THERAPEUTIC EXERCISES: CPT | Performed by: PHYSICAL THERAPIST
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
## 2022-01-19 ENCOUNTER — TELEPHONE (OUTPATIENT)
Dept: PHYSICAL THERAPY | Facility: HOSPITAL | Age: 47
End: 2022-01-19

## 2022-01-19 ENCOUNTER — APPOINTMENT (OUTPATIENT)
Dept: PHYSICAL THERAPY | Age: 47
End: 2022-01-19
Attending: FAMILY MEDICINE
Payer: MEDICAID

## 2022-01-20 ENCOUNTER — OFFICE VISIT (OUTPATIENT)
Dept: PHYSICAL THERAPY | Age: 47
End: 2022-01-20
Attending: FAMILY MEDICINE
Payer: MEDICAID

## 2022-01-20 PROCEDURE — 97110 THERAPEUTIC EXERCISES: CPT | Performed by: PHYSICAL THERAPIST
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
## 2022-01-21 ENCOUNTER — APPOINTMENT (OUTPATIENT)
Dept: PHYSICAL THERAPY | Age: 47
End: 2022-01-21
Attending: FAMILY MEDICINE
Payer: MEDICAID

## 2022-01-26 ENCOUNTER — OFFICE VISIT (OUTPATIENT)
Dept: PHYSICAL THERAPY | Age: 47
End: 2022-01-26
Attending: FAMILY MEDICINE
Payer: MEDICAID

## 2022-01-26 PROCEDURE — 97110 THERAPEUTIC EXERCISES: CPT | Performed by: PHYSICAL THERAPIST
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
## 2022-01-28 ENCOUNTER — APPOINTMENT (OUTPATIENT)
Dept: PHYSICAL THERAPY | Age: 47
End: 2022-01-28
Attending: FAMILY MEDICINE
Payer: MEDICAID

## 2022-02-01 ENCOUNTER — OFFICE VISIT (OUTPATIENT)
Dept: PHYSICAL THERAPY | Age: 47
End: 2022-02-01
Attending: FAMILY MEDICINE
Payer: MEDICAID

## 2022-02-01 PROCEDURE — 97110 THERAPEUTIC EXERCISES: CPT | Performed by: PHYSICAL THERAPIST

## 2022-02-03 ENCOUNTER — APPOINTMENT (OUTPATIENT)
Dept: PHYSICAL THERAPY | Age: 47
End: 2022-02-03
Attending: FAMILY MEDICINE
Payer: MEDICAID

## 2022-02-08 ENCOUNTER — OFFICE VISIT (OUTPATIENT)
Dept: PHYSICAL THERAPY | Age: 47
End: 2022-02-08
Attending: FAMILY MEDICINE
Payer: MEDICAID

## 2022-02-08 PROCEDURE — 97110 THERAPEUTIC EXERCISES: CPT | Performed by: PHYSICAL THERAPIST

## 2022-02-10 ENCOUNTER — APPOINTMENT (OUTPATIENT)
Dept: PHYSICAL THERAPY | Age: 47
End: 2022-02-10
Attending: FAMILY MEDICINE
Payer: MEDICAID

## 2022-02-10 ENCOUNTER — TELEPHONE (OUTPATIENT)
Dept: PHYSICAL THERAPY | Facility: HOSPITAL | Age: 47
End: 2022-02-10

## 2022-02-15 ENCOUNTER — OFFICE VISIT (OUTPATIENT)
Dept: PHYSICAL THERAPY | Age: 47
End: 2022-02-15
Attending: FAMILY MEDICINE
Payer: MEDICAID

## 2022-02-15 PROCEDURE — 97110 THERAPEUTIC EXERCISES: CPT | Performed by: PHYSICAL THERAPIST

## 2022-02-17 ENCOUNTER — APPOINTMENT (OUTPATIENT)
Dept: PHYSICAL THERAPY | Age: 47
End: 2022-02-17
Attending: FAMILY MEDICINE
Payer: MEDICAID

## 2022-02-22 ENCOUNTER — OFFICE VISIT (OUTPATIENT)
Dept: PHYSICAL THERAPY | Age: 47
End: 2022-02-22
Attending: FAMILY MEDICINE
Payer: MEDICAID

## 2022-02-22 PROCEDURE — 97110 THERAPEUTIC EXERCISES: CPT | Performed by: PHYSICAL THERAPIST

## 2022-02-24 ENCOUNTER — OFFICE VISIT (OUTPATIENT)
Dept: PHYSICAL THERAPY | Age: 47
End: 2022-02-24
Attending: FAMILY MEDICINE
Payer: MEDICAID

## 2022-02-24 PROCEDURE — 97110 THERAPEUTIC EXERCISES: CPT | Performed by: PHYSICAL THERAPIST

## 2022-03-02 ENCOUNTER — OFFICE VISIT (OUTPATIENT)
Dept: PHYSICAL THERAPY | Age: 47
End: 2022-03-02
Attending: FAMILY MEDICINE
Payer: MEDICAID

## 2022-03-02 PROCEDURE — 97110 THERAPEUTIC EXERCISES: CPT | Performed by: PHYSICAL THERAPIST

## 2022-03-04 ENCOUNTER — APPOINTMENT (OUTPATIENT)
Dept: PHYSICAL THERAPY | Age: 47
End: 2022-03-04
Attending: FAMILY MEDICINE
Payer: MEDICAID

## 2022-03-17 ENCOUNTER — OFFICE VISIT (OUTPATIENT)
Dept: PHYSICAL THERAPY | Age: 47
End: 2022-03-17
Attending: FAMILY MEDICINE
Payer: MEDICAID

## 2022-03-17 PROCEDURE — 97110 THERAPEUTIC EXERCISES: CPT | Performed by: PHYSICAL THERAPIST

## 2022-04-08 ENCOUNTER — OFFICE VISIT (OUTPATIENT)
Dept: PHYSICAL THERAPY | Age: 47
End: 2022-04-08
Attending: FAMILY MEDICINE
Payer: MEDICAID

## 2022-04-08 PROCEDURE — 97110 THERAPEUTIC EXERCISES: CPT | Performed by: PHYSICAL THERAPIST

## 2022-04-11 ENCOUNTER — HOSPITAL ENCOUNTER (EMERGENCY)
Facility: HOSPITAL | Age: 47
Discharge: HOME | End: 2022-04-11
Attending: EMERGENCY MEDICINE
Payer: MEDICAID

## 2022-04-11 VITALS
HEART RATE: 81 BPM | BODY MASS INDEX: 27.62 KG/M2 | SYSTOLIC BLOOD PRESSURE: 131 MMHG | TEMPERATURE: 97 F | RESPIRATION RATE: 18 BRPM | WEIGHT: 176 LBS | HEIGHT: 67 IN | OXYGEN SATURATION: 97 % | DIASTOLIC BLOOD PRESSURE: 88 MMHG
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
## 2022-04-11 PROCEDURE — 99283 EMERGENCY DEPT VISIT LOW MDM: CPT

## 2022-04-11 RX ORDER — IBUPROFEN 600 MG/1
600 TABLET ORAL ONCE
Status: COMPLETED | OUTPATIENT
Start: 2022-04-11 | End: 2022-04-11
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
## 2022-04-12 ENCOUNTER — TELEPHONE (OUTPATIENT)
Dept: FAMILY MEDICINE CLINIC | Facility: CLINIC | Age: 47
End: 2022-04-12
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
## 2022-04-13 NOTE — TELEPHONE ENCOUNTER
Please find any so-called reserved slot and put the patient in. No double book. Patient also has the option of seeing one of my partners if they are available to do so.

## 2022-04-13 NOTE — TELEPHONE ENCOUNTER
Spoke, with the patient and informed him of the message below. The first available res 24 with Dr. Mcintyre is on 5-2-22. Pt does have an appointment scheduled with another provider on 4-14-22. Pt would like to keep the appointment with the other provider on 4-14-22. No appointment was scheduled at this time.

## 2022-04-14 ENCOUNTER — OFFICE VISIT (OUTPATIENT)
Dept: FAMILY MEDICINE CLINIC | Facility: CLINIC | Age: 47
End: 2022-04-14
Payer: MEDICAID

## 2022-04-14 VITALS
WEIGHT: 176 LBS | SYSTOLIC BLOOD PRESSURE: 125 MMHG | HEART RATE: 65 BPM | DIASTOLIC BLOOD PRESSURE: 85 MMHG | RESPIRATION RATE: 16 BRPM | BODY MASS INDEX: 27.62 KG/M2 | HEIGHT: 67 IN

## 2022-04-14 DIAGNOSIS — G44.319 ACUTE POST-TRAUMATIC HEADACHE, NOT INTRACTABLE: ICD-10-CM

## 2022-04-14 DIAGNOSIS — M79.10 MYALGIA: ICD-10-CM

## 2022-04-14 DIAGNOSIS — S06.0X0A CONCUSSION WITHOUT LOSS OF CONSCIOUSNESS, INITIAL ENCOUNTER: Primary | ICD-10-CM

## 2022-04-14 PROCEDURE — 3079F DIAST BP 80-89 MM HG: CPT | Performed by: STUDENT IN AN ORGANIZED HEALTH CARE EDUCATION/TRAINING PROGRAM

## 2022-04-14 PROCEDURE — 3074F SYST BP LT 130 MM HG: CPT | Performed by: STUDENT IN AN ORGANIZED HEALTH CARE EDUCATION/TRAINING PROGRAM

## 2022-04-14 PROCEDURE — 99213 OFFICE O/P EST LOW 20 MIN: CPT | Performed by: STUDENT IN AN ORGANIZED HEALTH CARE EDUCATION/TRAINING PROGRAM

## 2022-04-14 PROCEDURE — 3008F BODY MASS INDEX DOCD: CPT | Performed by: STUDENT IN AN ORGANIZED HEALTH CARE EDUCATION/TRAINING PROGRAM

## 2022-04-14 RX ORDER — METHOCARBAMOL 750 MG/1
750 TABLET, FILM COATED ORAL 4 TIMES DAILY PRN
Qty: 60 TABLET | Refills: 0 | Status: SHIPPED | OUTPATIENT
Start: 2022-04-14

## 2022-04-14 RX ORDER — METHYLPREDNISOLONE 4 MG/1
TABLET ORAL
Qty: 1 EACH | Refills: 0 | Status: SHIPPED | OUTPATIENT
Start: 2022-04-14 | End: 2022-05-05

## 2022-05-05 ENCOUNTER — HOSPITAL ENCOUNTER (OUTPATIENT)
Dept: GENERAL RADIOLOGY | Facility: HOSPITAL | Age: 47
Discharge: HOME | End: 2022-05-05
Attending: STUDENT IN AN ORGANIZED HEALTH CARE EDUCATION/TRAINING PROGRAM
Payer: MEDICAID

## 2022-05-05 ENCOUNTER — OFFICE VISIT (OUTPATIENT)
Dept: FAMILY MEDICINE CLINIC | Facility: CLINIC | Age: 47
End: 2022-05-05
Payer: MEDICAID

## 2022-05-05 VITALS
DIASTOLIC BLOOD PRESSURE: 87 MMHG | SYSTOLIC BLOOD PRESSURE: 128 MMHG | HEART RATE: 71 BPM | WEIGHT: 175 LBS | BODY MASS INDEX: 27.47 KG/M2 | RESPIRATION RATE: 16 BRPM | HEIGHT: 67 IN
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
## 2022-05-05 DIAGNOSIS — G44.319 ACUTE POST-TRAUMATIC HEADACHE, NOT INTRACTABLE: ICD-10-CM

## 2022-05-05 PROCEDURE — 72072 X-RAY EXAM THORAC SPINE 3VWS: CPT | Performed by: STUDENT IN AN ORGANIZED HEALTH CARE EDUCATION/TRAINING PROGRAM

## 2022-05-05 PROCEDURE — 3079F DIAST BP 80-89 MM HG: CPT | Performed by: STUDENT IN AN ORGANIZED HEALTH CARE EDUCATION/TRAINING PROGRAM

## 2022-05-05 PROCEDURE — 99213 OFFICE O/P EST LOW 20 MIN: CPT | Performed by: STUDENT IN AN ORGANIZED HEALTH CARE EDUCATION/TRAINING PROGRAM

## 2022-05-05 PROCEDURE — 3008F BODY MASS INDEX DOCD: CPT | Performed by: STUDENT IN AN ORGANIZED HEALTH CARE EDUCATION/TRAINING PROGRAM

## 2022-05-05 PROCEDURE — 72050 X-RAY EXAM NECK SPINE 4/5VWS: CPT | Performed by: STUDENT IN AN ORGANIZED HEALTH CARE EDUCATION/TRAINING PROGRAM

## 2022-05-05 PROCEDURE — 3074F SYST BP LT 130 MM HG: CPT | Performed by: STUDENT IN AN ORGANIZED HEALTH CARE EDUCATION/TRAINING PROGRAM

## 2022-05-05 RX ORDER — METHOCARBAMOL 750 MG/1
750 TABLET, FILM COATED ORAL 4 TIMES DAILY PRN
Qty: 60 TABLET | Refills: 0 | Status: SHIPPED | OUTPATIENT
Start: 2022-05-05

## 2022-05-11 ENCOUNTER — OFFICE VISIT (OUTPATIENT)
Dept: PHYSICAL THERAPY | Age: 47
End: 2022-05-11
Attending: STUDENT IN AN ORGANIZED HEALTH CARE EDUCATION/TRAINING PROGRAM
Payer: MEDICAID

## 2022-05-11 DIAGNOSIS — M79.10 MYALGIA: ICD-10-CM

## 2022-05-11 PROCEDURE — 97162 PT EVAL MOD COMPLEX 30 MIN: CPT | Performed by: PHYSICAL THERAPIST

## 2022-05-11 PROCEDURE — 97110 THERAPEUTIC EXERCISES: CPT | Performed by: PHYSICAL THERAPIST

## 2022-05-12 NOTE — PATIENT INSTRUCTIONS
Patient was instructed in and issued a HEP for seated thoracic spine extension 10 reps 3-4x/day;  Establishing a test motion (ie (R/L) rotation) to compare symptoms before and after exercise, posture correction using a lumbar roll; avoidance of sitting on the bed/couch/recliner.

## 2022-05-13 ENCOUNTER — APPOINTMENT (OUTPATIENT)
Dept: PHYSICAL THERAPY | Age: 47
End: 2022-05-13
Attending: STUDENT IN AN ORGANIZED HEALTH CARE EDUCATION/TRAINING PROGRAM
Payer: MEDICAID

## 2022-05-19 ENCOUNTER — OFFICE VISIT (OUTPATIENT)
Dept: NEUROLOGY | Facility: CLINIC | Age: 47
End: 2022-05-19
Payer: MEDICAID

## 2022-05-19 VITALS
HEART RATE: 82 BPM | HEIGHT: 67 IN | SYSTOLIC BLOOD PRESSURE: 132 MMHG | WEIGHT: 175 LBS | BODY MASS INDEX: 27.47 KG/M2 | DIASTOLIC BLOOD PRESSURE: 88 MMHG

## 2022-05-19 DIAGNOSIS — G89.29 CHRONIC MIDLINE BACK PAIN, UNSPECIFIED BACK LOCATION: ICD-10-CM

## 2022-05-19 DIAGNOSIS — F07.81 POST CONCUSSION SYNDROME: ICD-10-CM

## 2022-05-19 DIAGNOSIS — M54.9 CHRONIC MIDLINE BACK PAIN, UNSPECIFIED BACK LOCATION: ICD-10-CM

## 2022-05-19 DIAGNOSIS — G44.321 INTRACTABLE CHRONIC POST-TRAUMATIC HEADACHE: Primary | ICD-10-CM

## 2022-05-19 PROCEDURE — 3075F SYST BP GE 130 - 139MM HG: CPT | Performed by: OTHER

## 2022-05-19 PROCEDURE — 99204 OFFICE O/P NEW MOD 45 MIN: CPT | Performed by: OTHER

## 2022-05-19 PROCEDURE — 3008F BODY MASS INDEX DOCD: CPT | Performed by: OTHER

## 2022-05-19 PROCEDURE — 3079F DIAST BP 80-89 MM HG: CPT | Performed by: OTHER

## 2022-05-19 RX ORDER — AMITRIPTYLINE HYDROCHLORIDE 25 MG/1
TABLET, FILM COATED ORAL
Qty: 90 TABLET | Refills: 3 | Status: SHIPPED | OUTPATIENT
Start: 2022-05-19

## 2022-05-20 ENCOUNTER — TELEPHONE (OUTPATIENT)
Dept: PHYSICAL THERAPY | Facility: HOSPITAL | Age: 47
End: 2022-05-20

## 2022-05-25 ENCOUNTER — TELEPHONE (OUTPATIENT)
Dept: NEUROLOGY | Facility: CLINIC | Age: 47
End: 2022-05-25

## 2022-05-25 NOTE — TELEPHONE ENCOUNTER
----- Message from Nae Coley MD sent at 5/25/2022  9:55 AM CDT -----  Please let patient know that MRI brain didn't show significant abnormalities.

## 2022-06-01 ENCOUNTER — APPOINTMENT (OUTPATIENT)
Dept: PHYSICAL THERAPY | Facility: HOSPITAL | Age: 47
End: 2022-06-01
Attending: STUDENT IN AN ORGANIZED HEALTH CARE EDUCATION/TRAINING PROGRAM
Payer: MEDICAID

## 2022-06-01 ENCOUNTER — OFFICE VISIT (OUTPATIENT)
Dept: PHYSICAL THERAPY | Facility: HOSPITAL | Age: 47
End: 2022-06-01
Attending: Other
Payer: MEDICAID

## 2022-06-01 DIAGNOSIS — M54.9 CHRONIC MIDLINE BACK PAIN, UNSPECIFIED BACK LOCATION: ICD-10-CM

## 2022-06-01 DIAGNOSIS — G89.29 CHRONIC MIDLINE BACK PAIN, UNSPECIFIED BACK LOCATION: ICD-10-CM

## 2022-06-01 DIAGNOSIS — F07.81 POST CONCUSSION SYNDROME: ICD-10-CM

## 2022-06-01 PROCEDURE — 97162 PT EVAL MOD COMPLEX 30 MIN: CPT

## 2022-06-03 ENCOUNTER — OFFICE VISIT (OUTPATIENT)
Dept: SPEECH THERAPY | Facility: HOSPITAL | Age: 47
End: 2022-06-03
Attending: Other
Payer: MEDICAID

## 2022-06-03 DIAGNOSIS — F07.81 POST CONCUSSION SYNDROME: ICD-10-CM

## 2022-06-03 PROCEDURE — 92523 SPEECH SOUND LANG COMPREHEN: CPT
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
## 2022-06-07 ENCOUNTER — OFFICE VISIT (OUTPATIENT)
Dept: SPEECH THERAPY | Facility: HOSPITAL | Age: 47
End: 2022-06-07
Attending: Other
Payer: MEDICAID

## 2022-06-07 PROCEDURE — 92507 TX SP LANG VOICE COMM INDIV: CPT

## 2022-06-07 NOTE — PROGRESS NOTES
Diagnosis: post concussion syndrome  Authorized # of Visits: 6        Precautions: Covid PPE          Subjective: Pain pre-tx: 0/10. He had headaches and spine pain this past week. Headaches usually begin towards the end of the day. Objective:      Date: 6/7/2022  Tx#: 1/6 Date: Tx#: 2/6 Date: Tx#: 3/6 Date: Tx#: 4/6 Date: Tx#: 5/6 Date: Tx#: 6/6   Antonyms/  synonyms Antonyms 90% w/o delay  Synonyms 90% w/o delay         Generative naming 4-6 items/min          V/E complex explanations Gave HEP to generate presentation related to work. Memory strategies Presented strategies and discussed and gave examples of each. Recall and retell paragraphs         Recall list of 5 items 2/5 after 5 min delay            Assessment: MoCA was administered and deficits in memory and word recall were noted. See below for details. Added goal for recall of 5 pieces of information. Discussed memory and word recall strategies. Practiced various strategies for word recall. MoCA  Exec fxn: 4/5  Naming 3/3  Attention: 6/6  Language: 2/3  Abstraction:  2/2  Memory: 2/5  Orientation: 5/6  Total:  24/30      Goals: (to be met in 10 visits)   1. Pt will state 3 word recall strategies and use them in structured tasks with 90% accuracy and in spontaneous language with 80% accuracy. 2.  Pt will name antonyms, synonyms and describe words and complete responsive naming tasks with 90% accuracy. 3.  Pt will improve generative naming to 20/minute for concrete categories and 12 for abstract or initial letter categories. 4.  Pt will generate complex explanations with 95% accuracy. 5.  Pt will give presentation on work related topics with 90% accuracy without obvious hesitations, revisions, word errors. 6.  Pt will name 4 memory strategies and give examples on when to use each with 90% accuracy. 7.  Pt will use strategies to recall paragraph with 90% accuracy.   8.  Pt will participate in further assessment of memory and other cognitive tasks as warranted. 9.  Pt will recall 5 unrelated words, parts of message or pieces of information with 90% accuracy after 5 minute delay. Plan: Continue therapy per specified goals.   Skilled Services: cognitive communication speech therapy    Charges: [id number]         Total Treatment Time: 48 min    Mohinder Lopez MA/CCC-SLP  Speech Language Pathologist  [address] [facility]  [phone]

## 2022-06-08 ENCOUNTER — TELEPHONE (OUTPATIENT)
Dept: PHYSICAL THERAPY | Facility: HOSPITAL | Age: 47
End: 2022-06-08

## 2022-06-08 ENCOUNTER — APPOINTMENT (OUTPATIENT)
Dept: PHYSICAL THERAPY | Age: 47
End: 2022-06-08
Attending: STUDENT IN AN ORGANIZED HEALTH CARE EDUCATION/TRAINING PROGRAM
Payer: MEDICAID

## 2022-06-14 ENCOUNTER — OFFICE VISIT (OUTPATIENT)
Dept: PHYSICAL THERAPY | Facility: HOSPITAL | Age: 47
End: 2022-06-14
Attending: Other
Payer: MEDICAID

## 2022-06-14 PROCEDURE — 97112 NEUROMUSCULAR REEDUCATION: CPT

## 2022-06-14 NOTE — PROGRESS NOTES
Diagnosis: post-concussion, headache, imbalance   Visit (# authorized):  12 per POC (6 visits auth exp 11/30, [facility])                        Referring Physician: Deepthi Macedo    Precautions: none    Medication changes since last visit?:  No    Subjective: Pt reports having headache up to 8/10 after last session with +nausea. Next day headache was 3-4/10. Did try to be somewhat active despite this headache and activity paced as instructed. Was able to take a walk the next day keeping headaches below 5/10. Feels as if he is making progress though continues to experience headaches. Focuses on activity pacing to limit symptoms. No additional nausea since that initial day. Headache currently 3/10. No c/o of nausea or dizziness. Objective:    Date  6/14          Visit Number  2          NMR x          Ther EX           Ther Act           Gait Training           CRM            Manual             Additional Treatment Information:    NMR (30 mins):   Oculomotor exam:    Saccades: negative, +mild nausea    Smooth pursuits: negative, +dizziness    Spontaneous nystagmus absent    Gaze evoked nystagmus absent    Convergence + NPC 9 cm   Head thrust deferred 2/2 irritability of symptoms   VOR cxl negative, asymptomatic    VOR screen  +dizziness   Positional testing:    arden hallpike: negative B    Roll test: negative   *reports symptoms upon sitting up from all positions, reports symptoms of \"wooziness\" with goggles (fixation blocked)     Non-billable: cervical cold pack with relaxation x10 mins at end of session for sx management     Patient Education:  Discussed activity pacing and encouraged continued exposure       Assessment:   Headache reduced to 2/10 after use of cold pack. No symptoms of dizziness or nausea at end of session. No evidence of BPPV. Oculomotor exam normal but triggers symptoms of dizziness and nausea indicating motion intolerance.      Plan: in future sessions: habituation HEP, treadmill endurance test Charges: 2 NMR       Total Timed Treatment: 30 min  Total Treatment Time: 40 min

## 2022-06-17 ENCOUNTER — OFFICE VISIT (OUTPATIENT)
Dept: PHYSICAL THERAPY | Age: 47
End: 2022-06-17
Attending: STUDENT IN AN ORGANIZED HEALTH CARE EDUCATION/TRAINING PROGRAM
Payer: MEDICAID

## 2022-06-17 PROCEDURE — 97110 THERAPEUTIC EXERCISES: CPT | Performed by: PHYSICAL THERAPIST

## 2022-06-17 NOTE — PATIENT INSTRUCTIONS
Pronelying sustained c/s extension on knuckles x 1 min to c/s extension to thumb or finger tip overpressure x 10 reps 2-3x/day.

## 2022-06-21 ENCOUNTER — OFFICE VISIT (OUTPATIENT)
Dept: SPEECH THERAPY | Facility: HOSPITAL | Age: 47
End: 2022-06-21
Attending: Other
Payer: MEDICAID

## 2022-06-21 ENCOUNTER — OFFICE VISIT (OUTPATIENT)
Dept: PHYSICAL THERAPY | Facility: HOSPITAL | Age: 47
End: 2022-06-21
Attending: Other
Payer: MEDICAID

## 2022-06-21 ENCOUNTER — TELEPHONE (OUTPATIENT)
Dept: PHYSICAL THERAPY | Facility: HOSPITAL | Age: 47
End: 2022-06-21

## 2022-06-21 PROCEDURE — 97112 NEUROMUSCULAR REEDUCATION: CPT

## 2022-06-21 PROCEDURE — 92507 TX SP LANG VOICE COMM INDIV: CPT

## 2022-06-21 NOTE — PATIENT INSTRUCTIONS
Do these exercises 1-2 times each day. 1. Hold string out from edge of nose, beads spaced out. Look at bead one and the string in front of it should double. Look at bead 2 (middle bead) and you should see an X, Look at bead three (furthest bead from you) and you should see a V (the other 2 beads will be doubled) Do this 3-5 times. 2. Stand tall, arms length away from target on wall at eye level. Turn head side to side like you are saying \"no\",  while keeping eyes focused on the target. Make sure that your head movements are small but as fast as you can tolerate. Keep the target clear as you turn- do not let the target blur or double. Turn your head for 60 seconds. If symptoms start to elevate past 5/10, do not stop head movement but slow down. Repeat with up and down head movements like you are saying \"yes\". 3. Stand and hold a target at arms length in front of your eyes. Keep eyes focused on target. Turn eyes, head and target together left and right. Do 5 turns, then repeat in up and down direction. 4. Stand with your bed behind you for safety. Stand tall and place feet next to each other so that they are touching. Cross arms, close your eyes. Balance for 30 seconds, paying attention to the feeling of your feet on the floor. 5. Stand up tall with hands on wall and feet firmly on ground. Close eyes and pay attention to feeling of hands and feet on support surfaces. Push the wall away through your arms. With your eyes closed, lift one hand off the wall. Return hand to wall and repeat with the other arm. Alternate until you have performed 10 on each side. Eyes closed the entire time.

## 2022-06-21 NOTE — PROGRESS NOTES
Diagnosis: post-concussion, headache, imbalance   Visit (# authorized):  12 per POC (6 visits auth exp 11/30, [facility])                        Referring Physician: Og Danielle    Precautions: none    Medication changes since last visit?:  No    Subjective: Better over past week but feels \"unorganized\" -has been doing more physical activity. Finding speech and memory issues are frustrating to him. Ice pack has been helpful for sx management. Has tried reading but difficulty concentrating. No current headache or dizziness. Continues to experience frequent headaches but did have 1-2 headache free days in the past week. Some queasiness with cognitive tasks today in speech. Objective:    Date  6/14 6/21         Visit Number  2 3         NMR x x         Ther EX           Ther Act           Gait Training           CRM            Manual             Additional Treatment Information:    NMR (38 mins):   Cody string- demo with return demo  VOR x1 horizontal (increased sx's) and vertical (increased sx's) 1 min ea- demo with return demo  VOR cxl demo with return demo- horizontal and vertical x5 ea - VC's for proper performance   Romberg EC 30 seconds (increased postural sway)   Hands on wall EC alt UE lifts (grounding) x10 B -demo with return demo  Rebounder 7# :modified tandem x15 ea foot in front   PNF lifts with trunk rotation 7# ball -eyes and head follow-     Non-billable: cervical cold pack with relaxation x10 mins at end of session for sx management     Patient Education:  Discussed activity pacing for community-based tasks such as grocery shopping. Rationale for exercises explained. HEP for habituation initiated and issued- see pt instructions. Assessment:   Pt with increased sx's as above improving with seated rest. Verbalizes understanding of material taught today but may benefit from review next session to ensure proper carryover. He seems to be progressing well albeit slowly. Sx's at baseline at end of session. Plan: in future sessions:treadmill endurance test, foam      Charges: 3 NMR       Total Timed Treatment: 38 min  Total Treatment Time: 48 min

## 2022-06-21 NOTE — PROGRESS NOTES
Diagnosis: post concussion syndrome  Authorized # of Visits: 6        Precautions: Covid PPE          Subjective: Pain pre-tx: 05/10, lower back pain, no headache. Pt was given homework to develop presentation to give during session, he was overwhelmed and so did not complete task. Initially, Pt was frustrated with his cognitive difficulties, but now they are becoming worrisome. Pt goals including being able to \"organize my life back. \" Tasks that normally take a day, such as laudry or cleaning his room, now take a week to do. 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 Objective:      Date: 6/7/2022  Tx#: 1/6 Date: 6/21/22  Tx#: 2/6 Date: Tx#: 3/6 Date: Tx#: 4/6 Date: Tx#: 5/6 Date: Tx#: 6/6   Antonyms/  synonyms Antonyms 90% w/o delay  Synonyms 90% w/o delay  Gave HEP for adding member to category. Generative naming 4-6 items/min          V/E complex explanations Gave HEP to generate presentation related to work. Pt with significant difficulty. Pt's language is tangential and disorganized. Memory strategies Presented strategies and discussed and gave examples of each. Reviewed memory strategies of visualization and repetition. Cued patient to use for recalling paragraphs. Recall and retell paragraphs  Short 3-4 sentences using visualization 75%  Medium length, 4-5 sentences, 40%       Recall list of 5 items 2/5 after 5 min delay Gave HEP for recalling 4 items immediately using repetition. Assessment: Pt demonstrates tangential and disorganized expressive language. Memory for simple paragraphs is impaired. Pt was unable to do some of the HEP as he reported it was too difficult. He did not bring his folder to the sessions today.   He is not scheduled to return for next session until 7/15/22. He was asked to make sooner appointments due to his significant impairments. MoCA  Exec fxn: 4/5  Naming 3/3  Attention: 6/6  Language: 2/3  Abstraction:  2/2  Memory: 2/5  Orientation: 5/6  Total:  24/30      Goals: (to be met in 10 visits)   1. Pt will state 3 word recall strategies and use them in structured tasks with 90% accuracy and in spontaneous language with 80% accuracy. 2.  Pt will name antonyms, synonyms and describe words and complete responsive naming tasks with 90% accuracy. 3.  Pt will improve generative naming to 20/minute for concrete categories and 12 for abstract or initial letter categories. 4.  Pt will generate complex explanations with 95% accuracy. 5.  Pt will give presentation on work related topics with 90% accuracy without obvious hesitations, revisions, word errors. 6.  Pt will name 4 memory strategies and give examples on when to use each with 90% accuracy. 7.  Pt will use strategies to recall paragraph with 90% accuracy. 8.  Pt will participate in further assessment of memory and other cognitive tasks as warranted. 9.  Pt will recall 5 unrelated words, parts of message or pieces of information with 90% accuracy after 5 minute delay. Plan: Continue therapy per specified goals.   Skilled Services: cognitive communication speech therapy    Charges: [id number]         Total Treatment Time: 48 min    Jimena Morgan MA/GEN-SLP  Speech Language Pathologist  [facility]  [phone]

## 2022-06-23 ENCOUNTER — OFFICE VISIT (OUTPATIENT)
Dept: FAMILY MEDICINE CLINIC | Facility: CLINIC | Age: 47
End: 2022-06-23
Payer: MEDICAID

## 2022-06-23 VITALS
BODY MASS INDEX: 27.51 KG/M2 | DIASTOLIC BLOOD PRESSURE: 73 MMHG | HEIGHT: 67 IN | HEART RATE: 78 BPM | WEIGHT: 175.25 LBS | SYSTOLIC BLOOD PRESSURE: 111 MMHG

## 2022-06-23 DIAGNOSIS — M99.01 CERVICOTHORACIC SOMATIC DYSFUNCTION: ICD-10-CM

## 2022-06-23 DIAGNOSIS — G44.329 CHRONIC POST-TRAUMATIC HEADACHE, NOT INTRACTABLE: ICD-10-CM
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
## 2022-06-23 DIAGNOSIS — F07.81 POSTCONCUSSION SYNDROME: ICD-10-CM

## 2022-06-23 PROCEDURE — 99214 OFFICE O/P EST MOD 30 MIN: CPT | Performed by: FAMILY MEDICINE

## 2022-06-23 PROCEDURE — 3008F BODY MASS INDEX DOCD: CPT | Performed by: FAMILY MEDICINE

## 2022-06-23 PROCEDURE — 3078F DIAST BP <80 MM HG: CPT | Performed by: FAMILY MEDICINE

## 2022-06-23 PROCEDURE — 98925 OSTEOPATH MANJ 1-2 REGIONS: CPT | Performed by: FAMILY MEDICINE

## 2022-06-23 PROCEDURE — 3074F SYST BP LT 130 MM HG: CPT | Performed by: FAMILY MEDICINE

## 2022-06-23 NOTE — PATIENT INSTRUCTIONS
Patient being referred to chiropractor for continuation of proper alignment. Hopefully this helps to reduce the amount and intensity regarding the headaches that he presents with. Continue with PT/speech therapy.   Continue with and follow-up with neurology as recommended by the neurologist.

## 2022-06-29 ENCOUNTER — APPOINTMENT (OUTPATIENT)
Dept: PHYSICAL THERAPY | Age: 47
End: 2022-06-29
Attending: FAMILY MEDICINE
Payer: MEDICAID

## 2022-06-30 ENCOUNTER — TELEPHONE (OUTPATIENT)
Dept: PHYSICAL THERAPY | Facility: HOSPITAL | Age: 47
End: 2022-06-30

## 2022-06-30 ENCOUNTER — APPOINTMENT (OUTPATIENT)
Dept: PHYSICAL THERAPY | Facility: HOSPITAL | Age: 47
End: 2022-06-30
Attending: Other
Payer: MEDICAID

## 2022-07-01 ENCOUNTER — APPOINTMENT (OUTPATIENT)
Dept: PHYSICAL THERAPY | Age: 47
End: 2022-07-01
Attending: FAMILY MEDICINE
Payer: MEDICAID

## 2022-07-06 ENCOUNTER — APPOINTMENT (OUTPATIENT)
Dept: PHYSICAL THERAPY | Age: 47
End: 2022-07-06
Attending: FAMILY MEDICINE
Payer: MEDICAID

## 2022-07-08 ENCOUNTER — APPOINTMENT (OUTPATIENT)
Dept: PHYSICAL THERAPY | Age: 47
End: 2022-07-08
Attending: FAMILY MEDICINE
Payer: MEDICAID

## 2022-07-14 ENCOUNTER — OFFICE VISIT (OUTPATIENT)
Dept: PHYSICAL THERAPY | Facility: HOSPITAL | Age: 47
End: 2022-07-14
Attending: FAMILY MEDICINE
Payer: MEDICAID

## 2022-07-14 DIAGNOSIS — M54.9 CHRONIC MIDLINE BACK PAIN, UNSPECIFIED BACK LOCATION: ICD-10-CM

## 2022-07-14 DIAGNOSIS — G89.29 CHRONIC MIDLINE BACK PAIN, UNSPECIFIED BACK LOCATION: ICD-10-CM

## 2022-07-14 DIAGNOSIS — F07.81 POST CONCUSSION SYNDROME: ICD-10-CM

## 2022-07-14 PROCEDURE — 97112 NEUROMUSCULAR REEDUCATION: CPT

## 2022-07-14 PROCEDURE — 97110 THERAPEUTIC EXERCISES: CPT

## 2022-07-14 NOTE — PATIENT INSTRUCTIONS
Start incorporating body weight exercises and lifting light weights to tolerance. Rest with increase in symptoms. Yoga with Nicolasa on youtube. Playlists. Choose videos under \"yoga for mental health\" or \"grounding\". If any movement is too difficult, skip it. Do these exercises 1-2 times each day. 1. Hold string out from edge of nose, beads spaced out. Look at bead one and the string in front of it should double. Look at bead 2 (middle bead) and you should see an X, Look at bead three (furthest bead from you) and you should see a V (the other 2 beads will be doubled) Do this 3-5 times. 2. Stand tall, arms length away from target on wall at eye level. Turn head side to side like you are saying \"no\",  while keeping eyes focused on the target. Make sure that your head movements are small but as fast as you can tolerate. Keep the target clear as you turn- do not let the target blur or double. Turn your head for 60 seconds. If symptoms start to elevate past 5/10, do not stop head movement but slow down. Repeat with up and down head movements like you are saying \"yes\". 3. Stand and hold a target at arms length in front of your eyes. Keep eyes focused on target. Turn eyes, head and target together left and right. Do 5 turns, then repeat in up and down direction. 4. Stand with your bed behind you for safety. Stand tall and place feet next to each other so that they are touching. Cross arms, close your eyes. Balance for 30 seconds, paying attention to the feeling of your feet on the floor. 5. Stand up tall with hands on wall and feet firmly on ground. Close eyes and pay attention to feeling of hands and feet on support surfaces. Push the wall away through your arms. With your eyes closed, lift one hand off the wall. Return hand to wall and repeat with the other arm. Alternate until you have performed 10 on each side. Eyes closed the entire time.

## 2022-07-14 NOTE — PROGRESS NOTES
Diagnosis: post-concussion, headache, imbalance   Visit (# authorized):  12 per POC (6 visits auth exp 11/30, [facility])                        Referring Physician: Lenny Laguerre    Precautions: none    Medication changes since last visit?:  No    Subjective: Has been walking for exercise. Saw Dr Makeda Bach who performed a manipulation which helped therefore he was referred to a chiropractor for further adjustment. Feels as if he is tolerating more and having less frequent headaches. No current complaints of headache, dizziness, and nausea. Objective:    Date  6/14 6/21 7/14        Visit Number  2 3 4        NMR x x x        Ther EX   x        Ther Act           Gait Training           CRM            Manual             Additional Treatment Information:    Therapeutic Exercise (22 mins):   Exertion Assessment:  Mode of exercise:  ? Stationary bike        x Treadmill  Baseline Symptoms at rest:  0/10 Dizziness ;   0/10Headache ;   0/10Nausea  HR at rest:   98bts/min        Symptom Threshold:    118 bts/min  Max symptoms:  2/10 dizziness ;   0/10 headache;   3/10 nausea  Exercise Time: 13 minutes  Max HR:163  bts/min  Recovery time: 5 (min- Symptoms to baseline)           Pt educated on progressing level of activity to light weight lifting and body weight exercises - reinforced activity pacing    NMR (18 mins):   Quad bird-dog x5 B -demo with return demo (grounding)   Knee hovers 5 sec hold x10- demo with return demo (Grounding)   Pt educated on role/rationale of grounding exercises for sx management -encouraged yoga practice, information for yoga on youtube with recommendations for specific videos provided. Patient Education:  As above. HEP handout updated- see pt instructions.      Assessment:   Pt with increased sx's as above improving with seated rest. Pt's symptoms with exertion testing appear to be more so due to the the movement vs exertion as symptoms occurred quickly but did not change in increased HR and improved significantly after grounding exercises. Nausea 1/10 at end of session. No dizziness or headache at end of session. Pt verbalizes understanding of material taught today. Progressing steadily.      Plan: in future sessions:hiMAT, foam      Charges: 1 NMR, 2 TE       Total Timed Treatment: 38 min  Total Treatment Time: 40 min

## 2022-07-15 ENCOUNTER — OFFICE VISIT (OUTPATIENT)
Dept: SPEECH THERAPY | Facility: HOSPITAL | Age: 47
End: 2022-07-15
Attending: Other
Payer: MEDICAID

## 2022-07-15 PROCEDURE — 92507 TX SP LANG VOICE COMM INDIV: CPT

## 2022-07-15 NOTE — PROGRESS NOTES
Diagnosis: post concussion syndrome  Authorized # of Visits: 6        Precautions: Covid PPE          Subjective: Pain pre-tx: 2/10 headache, which remained the same at the end of session. Pt also reports lower back pain due to therapy yesterday. Pt is also seeing a chiropractor. Objective:      Date: 6/7/2022  Tx#: 1/6 Date: 6/21/22  Tx#: 2/6 Date: 7/15/22  Tx#: 3/6 Date: Tx#: 4/6 Date: Tx#: 5/6 Date: Tx#: 6/6   Antonyms/  synonyms Antonyms 90% w/o delay  Synonyms 90% w/o delay  Gave HEP for adding member to category. Generative naming 4-6 items/min    Generative naming 13/min. Improved organization. V/E complex explanations Gave HEP to generate presentation related to work. Pt with significant difficulty. Pt's language is tangential and disorganized. Pt's expressive language more organized. Less tangential language. Generated outline of presentation to students. Mild-mod cues required. Memory strategies Presented strategies and discussed and gave examples of each. Reviewed memory strategies of visualization and repetition. Cued patient to use for recalling paragraphs. Recall and retell paragraphs  Short 3-4 sentences using visualization 75%  Medium length, 4-5 sentences, 40%       Recall list of 5 items 2/5 after 5 min delay Gave HEP for recalling 4 items immediately using repetition. Exec fxn/organizational task   Deduction puzzle - mod cues required to systematically address cues. Divided attention mapping task -   mild cues          Assessment: Added goal to improve organizational/executive function skills as patient not able to organize work related tasks or know where to begin. Pt required mod cues for systematic completion of deduction puzzle. \"it was challenging, but I like it. \"          Goals: (to be met in 10 visits)   1.   Pt will state 3 word recall strategies and use them in structured tasks with 90% accuracy and in spontaneous language with 80% accuracy. 2.  Pt will name antonyms, synonyms and describe words and complete responsive naming tasks with 90% accuracy. 3.  Pt will improve generative naming to 20/minute for concrete categories and 12 for abstract or initial letter categories. 4.  Pt will generate complex explanations with 95% accuracy. 5.  Pt will give presentation on work related topics with 90% accuracy without obvious hesitations, revisions, word errors. 6.  Pt will name 4 memory strategies and give examples on when to use each with 90% accuracy. 7.  Pt will use strategies to recall paragraph with 90% accuracy. 8.  Pt will participate in further assessment of memory and other cognitive tasks as warranted. 9.  Pt will recall 5 unrelated words, parts of message or pieces of information with 90% accuracy after 5 minute delay. 10.  Pt will complete executive function tasks with min cues with 100% accuracy. Plan: Continue therapy per specified and updated goals.   Skilled Services: cognitive communication speech therapy    Charges: [id number]         Total Treatment Time: 48 min    Florecita York MA/GEN-SLP  Speech Language Pathologist  [address] [facility]  [phone]

## 2022-07-19 ENCOUNTER — OFFICE VISIT (OUTPATIENT)
Dept: PHYSICAL THERAPY | Facility: HOSPITAL | Age: 47
End: 2022-07-19
Attending: FAMILY MEDICINE
Payer: MEDICAID

## 2022-07-19 PROCEDURE — 97112 NEUROMUSCULAR REEDUCATION: CPT

## 2022-07-19 NOTE — PROGRESS NOTES
Diagnosis: post-concussion, headache, imbalance   Visit (# authorized):  12 per POC (6 visits auth exp 11/30, [facility])                        Referring Physician: Thierno Head    Precautions: none    Medication changes since last visit?:  No    Subjective: Has increased level of exercise since he was last seen. Experiencing soreness but otherwise is doing well. Sx's with activity but this resolves with rest. No current symptoms. Objective:    Date  6/14 6/21 7/14 7/19       Visit Number  2 3 4 5       NMR x x x x       Ther EX   x        Ther Act           Gait Training           CRM            Manual             Additional Treatment Information:    NMR (40 mins): HiMAT (High Level Mobility Assessment Tool)  Affected Limb L    Walk  _2__ (Time: 6.2 sec)  Walk Backwards  _2__ (Time: 8.9 sec)  Walk on Toes  _2__ (Time: 7.4 sec)  Walk Over Obstacle  _2__ (Time: 6.5 sec)  Run  _1__ (Time: 3.2 sec)  Skip  _1__ (Time: 6.3 sec)  Hop Forward (Affected) __1_ (Time: 8.5 sec)  Bound Affected  _4__(Av in cm >132)  Bound Unaffected _4__(Av in cm >129)  Up-stairs dependent __5__ (Time NA)  Up-stairs independent __4__ (Time 3.5 seconds)  Down-stairs dependent __5__ (Time NA)  Down-stairs independent __4__ (Time 4 seconds)    Total __37__/54    Modified RDL with foam roller to cone tap with hand -x10 B, SBA (imbalance)  Ball agility task -8 cones- (increased dizziness and nausea, mild headache)  Anti-rotations green TB 30 sec ea (grounding)   Wall push up EC x10 (grounding)    Patient Education:  Encouraged continued progression of exercise to tolerance. Assessment:   Pt with increased sx's as above improving with rest. Headache and dizziness at baseline at end of session. Nausea 2/10. Pt reports feeling \"off balance\" with various tasks during hiMAT. Score reflective of slower rate of movement vs postural instability however.      Plan: in future sessions: foam, cone taps, JPE      Charges: 3 NMR       Total Timed Treatment: 40 min  Total Treatment Time: 42 min

## 2022-07-22 ENCOUNTER — APPOINTMENT (OUTPATIENT)
Dept: SPEECH THERAPY | Facility: HOSPITAL | Age: 47
End: 2022-07-22
Attending: STUDENT IN AN ORGANIZED HEALTH CARE EDUCATION/TRAINING PROGRAM
Payer: MEDICAID

## 2022-07-22 ENCOUNTER — OFFICE VISIT (OUTPATIENT)
Dept: SPEECH THERAPY | Facility: HOSPITAL | Age: 47
End: 2022-07-22
Attending: Other
Payer: MEDICAID

## 2022-07-22 PROCEDURE — 92507 TX SP LANG VOICE COMM INDIV: CPT

## 2022-07-22 NOTE — PROGRESS NOTES
Diagnosis: post concussion syndrome  Authorized # of Visits: 6        Precautions: Covid PPE          Subjective:  Pt arrived over an hour late to session because he thought it started at 1pm instead of 12. C/o 3/10 headache. Pt was seen for a shortened session. Objective:      Date: 6/7/2022  Tx#: 1/6 Date: 6/21/22  Tx#: 2/6 Date: 7/15/22  Tx#: 3/6 Date: 7/22/22  Tx#: 4/6 Date: Tx#: 5/6 Date: Tx#: 6/6   Antonyms/  synonyms Antonyms 90% w/o delay  Synonyms 90% w/o delay  Gave HEP for adding member to category. Generative naming 4-6 items/min    Generative naming 13/min. Improved organization. V/E complex explanations Gave HEP to generate presentation related to work. Pt with significant difficulty. Pt's language is tangential and disorganized. Pt's expressive language more organized. Less tangential language. Generated outline of presentation to students. Mild-mod cues required. Memory strategies Presented strategies and discussed and gave examples of each. Reviewed memory strategies of visualization and repetition. Cued patient to use for recalling paragraphs. Recall and retell paragraphs  Short 3-4 sentences using visualization 75%  Medium length, 4-5 sentences, 40%  80%  When listened to story a second time, increased to 90%     Recall list of 5 items 2/5 after 5 min delay Gave HEP for recalling 4 items immediately using repetition. Exec fxn/organizational task   Deduction puzzle - mod cues required to systematically address cues. Divided attention mapping task -   mild cues Deduction puzzles  Several different types completed. All required mod assist.    Generating schedule based on clues  Mod cues required. Assessment: Pt headache and nausea increased from 3/10 to 5/10 during the session. He is not sure if it's because of the anxiety of arriving at the wrong time or the work completed. Cued patient to slow his breathing which helped slightly.   Session focused on use of visualization to aid in memory and executive functions. Pt still required moderate cues. Goals: (to be met in 10 visits)   1. Pt will state 3 word recall strategies and use them in structured tasks with 90% accuracy and in spontaneous language with 80% accuracy. 2.  Pt will name antonyms, synonyms and describe words and complete responsive naming tasks with 90% accuracy. 3.  Pt will improve generative naming to 20/minute for concrete categories and 12 for abstract or initial letter categories. 4.  Pt will generate complex explanations with 95% accuracy. 5.  Pt will give presentation on work related topics with 90% accuracy without obvious hesitations, revisions, word errors. 6.  Pt will name 4 memory strategies and give examples on when to use each with 90% accuracy. 7.  Pt will use strategies to recall paragraph with 90% accuracy. 8.  Pt will participate in further assessment of memory and other cognitive tasks as warranted. 9.  Pt will recall 5 unrelated words, parts of message or pieces of information with 90% accuracy after 5 minute delay. 10.  Pt will complete executive function tasks with min cues with 100% accuracy. Plan: Continue therapy per specified goals. Recommend continued therapy. Provider does not have any appointments open in the near future.   Pt placed on waitlist.  Skilled Services: cognitive communication speech therapy    Charges: [id number]         Total Treatment Time: 28 min    Daniel Coe MA/GEN-SLP  Speech Language Pathologist  [facility]  [phone]

## 2022-07-26 ENCOUNTER — OFFICE VISIT (OUTPATIENT)
Dept: PHYSICAL THERAPY | Facility: HOSPITAL | Age: 47
End: 2022-07-26
Attending: FAMILY MEDICINE
Payer: MEDICAID

## 2022-07-26 PROCEDURE — 97110 THERAPEUTIC EXERCISES: CPT

## 2022-07-26 PROCEDURE — 97112 NEUROMUSCULAR REEDUCATION: CPT

## 2022-07-26 NOTE — PROGRESS NOTES
Diagnosis: post-concussion, headache, imbalance   Visit (# authorized):  12 per POC (6 visits auth exp 11/30, [facility])                        Referring Physician: Brayan Narvaez    Precautions: none    Medication changes since last visit?:  No    Subjective: Headache 3/10. Denies nausea and dizziness. Has been exercising more regularly which he is tolerating well. Generally does not experience dizziness or nausea except last week when he mixed up the time of his SLP visit- thinks he may have been feeling anxious due to the mix-up. Objective:    Date  6/14 6/21 7/14 7/19 7/26      Visit Number  2 3 4 5 6      NMR x x x x x      Ther EX   x  x      Ther Act           Gait Training           CRM            Manual             Additional Treatment Information:    Therapeutic exercise (12 mins):   Nu-step level 6 x10 mins, BLE only +rest after (feels \"off balance\" at completion, no other change in sx's during or after)      NMR (28 mins):   JPE: (asymtpomatic with testing)   >6 degrees of error in all directions     Laser:    Gaze shifting task eyes then head with laser for feedback backwards 30-1    Tracing   Imagined targets: x5 ea R/L and up/down (increased sx's with both)    Patient Education:  HEP updated and issued- see pt instructions. Assessment:   Pt with increased sx's as above improving with rest.  Sx's at baseline at end of session however pt reports feeling fatigued. He thinks this may be cognitive fatigue but is unsure. JPE abnormal indicating likely cervicogenic component to sx's. Challenged by laser and imagined target tasks.      Plan: in future sessions: foam, cone taps    Charges: 2 NMR, 1 TE       Total Timed Treatment: 40 min  Total Treatment Time: 45 min

## 2022-07-28 ENCOUNTER — TELEPHONE (OUTPATIENT)
Dept: SPEECH THERAPY | Facility: HOSPITAL | Age: 47
End: 2022-07-28

## 2022-08-04 ENCOUNTER — OFFICE VISIT (OUTPATIENT)
Dept: PHYSICAL THERAPY | Facility: HOSPITAL | Age: 47
End: 2022-08-04
Attending: FAMILY MEDICINE
Payer: MEDICAID

## 2022-08-04 PROCEDURE — 97112 NEUROMUSCULAR REEDUCATION: CPT

## 2022-08-04 NOTE — PROGRESS NOTES
Diagnosis: post-concussion, headache, imbalance   Visit (# authorized):  12 per POC (6 visits auth exp 11/30, [facility])                        Referring Physician: Netta Araya    Precautions: none    Medication changes since last visit?:  No    Subjective: Has been increasing his level of activity. Feeling better overall. Last week experienced headache up to 6/10 started within in the gym. Tried resting- severity fluctuated. 3 day stretch without headache since he was last seen. No complaints of symptoms at this time. Objective:  Arrived late to session. Date  6/14 6/21 7/14 7/19 7/26 8/4     Visit Number  2 3 4 5 6 7     NMR x x x x x x     Ther EX   x  x      Ther Act           Gait Training           CRM            Manual             Additional Treatment Information:   NMR (30 mins):   Laser:    Tracing    Word search (visual scanning)- circling word with laser    VOR cxl x5 ea horizontal and vertical   Ball toss hand to hand with ambulation 40 ft x2 VC's to \"move head\"  Cone taps -following 2-3 step instructions     Patient Education:  Encouraged continued performance of HEP. Assessment:   Challenged by laser tasks requiring increased time and effort to complete. Pt reports headache \"pressure\" 1/10 (frontal); otherwise asymptomatic throughout.      Plan: in future sessions: foam, continue cone taps    Charges: 2 NMR       Total Timed Treatment: 30 min  Total Treatment Time: 30 min

## 2022-08-09 ENCOUNTER — OFFICE VISIT (OUTPATIENT)
Dept: PHYSICAL THERAPY | Facility: HOSPITAL | Age: 47
End: 2022-08-09
Attending: FAMILY MEDICINE
Payer: MEDICAID

## 2022-08-09 PROCEDURE — 97112 NEUROMUSCULAR REEDUCATION: CPT

## 2022-08-09 NOTE — PROGRESS NOTES
Progress Summary    Referring Physician: Mana Parsons    Diagnosis: post-concussion, headache, imbalance, neck pain     Pt has attended 8 visits in Physical Therapy. Subjective: Pt reports improvements with PT and would like to continue at this time. He feels as if his headaches have been managed better and is able to engage in more activity than when he first started. He has started to work more (4-6 hours at a time) without a significant increase in symptoms. He reports an improvement in his balance overall but does have some difficulty with higher level balance tasks that would normally be no problem. He is continuing to push himself and pace as needed. He has slowly started to return to running. He is exercising at 50-65% of his normal level of physical activity. Has been focusing on grounding-type exercises which he has found helpful. He feels 70% better overall. He has not had any nausea or dizziness since he was last seen. When nausea does occur it does not last long. No current complaints of headache. Headache up to 6/10 in past week. No longer getting daily headaches. Still undergoing chiropractic care but reports they are focusing more on this lower back. His neck pain is 6/10 at rest and up to 9/10 with movement. Assessment: Pt demonstrates improvement in tolerance to movement as well as physical exertion. Pt reports symptoms of mild nausea with head turns during gait only. He has been slowly improving and increasing his level of exertional activity and returning slowly back to baseline level of physical activity. Demonstrates slower rate of movement with higher level mobility tasks which is reflected in HiMAT score below what would be expected from someone his age and his premorbid level of activity. He continues to experience headaches and neck pain which have only been peripherally addressed to date.  JPE abnormal which could be underlying his symptoms of imbalance and unease with head movement during dynamic activities. Headache likely to have cervical component as well. MMT to be completed next session. Goals have been updated to reflect progress with new goals being added as appropriate. Muna Moses would benefit from continued, skilled PT intervention in order to further manage symptoms and facilitate full return to PLOF without restriction. Objective:   CROM:   R rotation WFL  L: rotation minimal restriction +pain   Extension WFL   Flexion: WFL +pain L side of neck   L Sidebend: MODERATE RESTRICTION +PAIN  R sidebend: MINIMAL RESTRICTION     TTP: suboccipitals L>R     JPE (tested 7/26): (asymtpomatic with testing)   >6 degrees of error in all directions       Functional Gait Assessment   Item Description Score (0 worst, 3 best)    ___2___1. Gait Level Surface-  Time: ___5.8 (unchanged)___seconds  ___3___2. Change in gait speed  ___3___3. Gait with horizontal head turns (+nausea)  ___3___4. Gait with vertical head turns (+nausea)  ___3___5. Gait and pivot turn   ___3___6. Step over obstacle  ___3___7. Gait with narrow base of support-  # of steps___10__  ___3___8. Gait with eyes closed-  Time: ___6.8 (11.5)___seconds   ___2___9. Ambulating backward (Slower speed)  ___3___10.  Steps    TOTAL SCORE: __28 (21 at eval)__/30    (less than 22/30 = fall risk)    HiMAT (High Level Mobility Assessment Tool)  Affected Limb L    Walk  _2__ (Time: 5.7 sec)  Walk Backwards  _3__ (Time: 7.3 sec)  Walk on Toes  _2__ (Time: 7.4 sec)  Walk Over Obstacle  _2__ (Time: 6 sec)  Run  _2__ (Time: 2.6 sec)  Skip  _1__ (Time: 4.3 sec)  Hop Forward (Affected) __2_ (Time: 5.7 sec)  Bound Affected  _4__(Av in cm >132)  Bound Unaffected _4__(Av in cm >129)  Up-stairs dependent __5__ (Time NA)  Up-stairs independent __4__ (Time 3.5 seconds)  Down-stairs dependent __5__ (Time NA)  Down-stairs independent __4__ (Time 4 seconds)    Total __40__/54    Neck Disability Index: 54% (27/50)     Goals:   Goals to be met within POC or 90 days: 1. Pt to be independent in HEP and sx management strategies. Goal is ongoing  2. Pt will be able to engage in baseline level of physical activity without increase in sx's. In progress, progress made. 3. Pt will improve FGA to 30/30 in order to reflect improved tolerance to and stability with functional, community ambulation. Goal nearly met, discontinued. New goal (8/9): Pt will be able to perform visual scanning/functional head turns without increase in pain or nausea to facilitate improved tolerance to tasks such as driving and navigating complex environments. New goal (8/9): Pt will improve neck disability index score by at least 6 points to reflect improved tolerance to functional and work activities in order to facilitate full return to PLOF. Charges: 3 NMR     Total timed treatment: 45 mins  Total treatment time: 55 mins      Rehab Potential: good    Plan: Continue skilled Physical Therapy 1 x/week or a total of 10 visits over a 90 day period. Treatment will include: Home exercise program development and instruction, Patient/family education, Balance training, Therapeutic Exercise, Therapeutic Activity, Neuromuscular Re-education, Canalith Repositioning Maneuver, Eye/head coordination exercises, Sensory organization training, Optokinetic stimulation, Exertion training; Gait Training; Manual Therapy. Patient was advised of these findings, precautions, and treatment options and has agreed to actively participate in planning and for this course of care. Thank you for your referral. If you have any questions, please contact me at Dept: [phone]. Sincerely,    Juaquin Brar PT, NCS    Electronically signed by therapist: Juaquin Brar PT    Physician's certification required: Yes  Please co-sign or sign and return this letter via fax as soon as possible to [phone].    I certify the need for these services furnished under this plan of treatment and while under my care.    X___________________________________________________ Date____________________    Certification From: 9/4/9506  To:11/7/2022

## 2022-08-16 ENCOUNTER — APPOINTMENT (OUTPATIENT)
Dept: PHYSICAL THERAPY | Facility: HOSPITAL | Age: 47
End: 2022-08-16
Attending: FAMILY MEDICINE
Payer: MEDICAID

## 2022-08-19 ENCOUNTER — OFFICE VISIT (OUTPATIENT)
Dept: PHYSICAL THERAPY | Facility: HOSPITAL | Age: 47
End: 2022-08-19
Attending: FAMILY MEDICINE
Payer: MEDICAID

## 2022-08-19 PROCEDURE — 97140 MANUAL THERAPY 1/> REGIONS: CPT

## 2022-08-19 PROCEDURE — 97110 THERAPEUTIC EXERCISES: CPT

## 2022-08-19 NOTE — PROGRESS NOTES
Diagnosis:   post-concussion, headache, imbalance, neck pain   Referring Provider: Regan Or  Date of Evaluation:    6/1/2022    Precautions:  hx of concussion Next MD visit:   none scheduled  Date of Surgery: n/a   Insurance Primary/Secondary: [employer] / N/A     # Auth Visits: 4            Subjective:  Denies any headache or dizziness today. Primary complaint of stiffness and pain along lower neck (CT junction) with end range rotation. Pain: 3-4/10 tightness and soreness end range. Objective:     C/S AROM:  Flexion: 41  Ext: 37  Rotation: R 57 pain, L 50 pain improved to 70 deg pain-free bilat. SB: R 20 pain, L 20 pain    SH AROM: WNL all planes bilat      DTR  C5: R 0, L 0  C6: R 0, L 0  C7 R 0, L 0    LT - Intact C2-T2 bilat    Vertebral artery test: (-) bilat  Alar ligament test: (-) bilat  Marlon's test: (-) bilat  Transverse ligament test: (-)     Posture: FWD head, sub cranial extension, increased T/S kyphosis, depressed/downwardly rotated scap    Jt restrictions: C1-2 up and FWD bilat, CT junction gapping bilat    Palpation: Mild TTP along suboccipitals and CT junction paraspinals. Assessment: Pt presents with mechanical neck pain without radiculopathy. Jt restrictions along upper C/S and CT junction limiting pt's neck rotation bilat. Movement impairment extension rotation syndrome C/S with segmental mobility restrictions of CT Junction and upper C/S. Responded well to mobilizations to upper C/S and manipulation to CT junction with improve pain-free neck rotation 70 deg bilat. Goals:   New goal (8/19): Be able to view blind spots pain-free to drive safely. New goal (8/19): Turning head and neck without difficulty and max 3/10 pain. New goal (8/9): Pt will be able to perform visual scanning/functional head turns without increase in pain or nausea to facilitate improved tolerance to tasks such as driving and navigating complex environments.    New goal (8/9): Pt will improve neck disability index score by at least 6 points to reflect improved tolerance to functional and work activities in order to facilitate full return to PLOF. Plan: Continue manual therapy to address jt restrictions, progress neck stabilization, and incorporate corrective exercises to improve scapular and neck mechanics    Date: 8/19/2022  TX#: 1/4 Date:                 TX#: 3/ Date:                 TX#: 4/ Date:                 TX#: 5/ Date:    Tx#: 6/   MT  -CT junction harmonics  -Prone CT junction manip R  -Seated CT junction distraction manip  -C1-2 PIVMs up and fwd bilat  -MWM C1-2 rotation bilat       EX  -Test and measures taken  -Education on anatomy of condition  -towel rotation stretching 2x5 bilat hold 5 sec  -CT junction self mobs sags x10 hold 5 sec       HEP - handout created and provided for exs above                Charges: MT, EX 3       Total Timed Treatment: MT 15 min, EX 38 min  Total Treatment Time: 53 min

## 2022-08-23 ENCOUNTER — APPOINTMENT (OUTPATIENT)
Dept: PHYSICAL THERAPY | Facility: HOSPITAL | Age: 47
End: 2022-08-23
Attending: FAMILY MEDICINE
Payer: MEDICAID

## 2022-08-30 ENCOUNTER — OFFICE VISIT (OUTPATIENT)
Dept: SPEECH THERAPY | Facility: HOSPITAL | Age: 47
End: 2022-08-30
Attending: Other
Payer: MEDICAID

## 2022-08-30 ENCOUNTER — APPOINTMENT (OUTPATIENT)
Dept: PHYSICAL THERAPY | Facility: HOSPITAL | Age: 47
End: 2022-08-30
Attending: FAMILY MEDICINE
Payer: MEDICAID

## 2022-08-30 PROCEDURE — 92507 TX SP LANG VOICE COMM INDIV: CPT

## 2022-08-30 NOTE — PROGRESS NOTES
FINAL THERAPY SESSION NOTE AND DISCHARGE SUMMARY    Diagnosis: post concussion syndrome  Authorized # of Visits: 6        Precautions: Covid PPE          Subjective:  Pt was seen for an initial evaluation on 6/3/22 followed by 5 therapy sessions for a total of 6 visits. Overall patient notes improvement. He is back to work full time. He feels his cognitive status is at 80%; he is 80% back to normal.  He still cannot work as long as he could before his concussion without taking a break. After 6 hours he starts to get fatigued. Previously he would be able to work for 12 hours. Pt still feels he needs to write things down because his recall is not as good as it was previously. He is having less headaches. Objective:      Date: 6/7/2022  Tx#: 2/6 Date: 6/21/22  Tx#: 3/6 Date: 7/15/22  Tx#: 4/6 Date: 7/22/22  Tx#: 5/6 Date: 8/30/22  Tx#: 6/6   Antonyms/  synonyms Antonyms 90% w/o delay  Synonyms 90% w/o delay  Gave HEP for adding member to category. Generative naming 4-6 items/min    Generative naming 13/min. Improved organization. Animals 15/min  Initial M words: 8/min   V/E complex explanations Gave HEP to generate presentation related to work. Pt with significant difficulty. Pt's language is tangential and disorganized. Pt's expressive language more organized. Less tangential language. Generated outline of presentation to students. Mild-mod cues required. Occasional pausing, but otherwise WNL. Memory strategies Presented strategies and discussed and gave examples of each. Reviewed memory strategies of visualization and repetition. Cued patient to use for recalling paragraphs. Pt recalled only use of writing things down which is what he primarily uses to aid his memory.    Recall and retell paragraphs  Short 3-4 sentences using visualization 75%  Medium length, 4-5 sentences, 40%  80%  When listened to story a second time, increased to 90% 11/18 for CLQT task, which is same as initial evaluation. Recall list of 5 items 2/5 after 5 min delay Gave HEP for recalling 4 items immediately using repetition. 100% during MoCA re-assessment   Exec fxn/organizational task   Deduction puzzle - mod cues required to systematically address cues. Divided attention mapping task -   mild cues Deduction puzzles  Several different types completed. All required mod assist.    Generating schedule based on clues  Mod cues required. CLQT design generation improved from 2 to 9 designs. Assessment: Portions of CLQT and MoCA re-administered. Improvements noted below. Pt still with some subtle difficulties with organization/executive function and memory. Pt understands he needs to tackle tasks strategically, completing one step at a time. He is able to write out all steps and break down larger tasks to smaller ones. Reviewed memory strategies and provided another reference handout for patient to take home. Pt is primarily using writing to aid his memory as well as to breakdown more difficult tasks into smaller segments. Patient's expressive language has improved. He is able to explain with minimal pauses to gather thoughts and for word recall. See additional objective information below for each therapy goal and for test results. MoCA  6/7/22 8/30/22  Exec fxn: 4/5  5/5  Naming 3/3  3/3   Attention: 6/6 6/6  Language: 2/3  2/3  Abstraction:  2/2  2/2  Memory: 2/5 5/5  Orientation: 5/6 6/6  Total:  24/30 28/30    CLQT scores: INITIAL  8/30/22  Attention:  196, WNL  Memory:   153,   163  Executive fXn:   25,   32    Language:   30,  30  Visuospatial:   90,     Goals: (to be met in 10 visits)   1. Pt will state 3 word recall strategies and use them in structured tasks with 90% accuracy and in spontaneous language with 80% accuracy. Not met. Pt predominately uses writing to remember.     2.  Pt will name antonyms, synonyms and describe words and complete responsive naming tasks with 90% accuracy. Goal met. Less pausing due to word recall challenges in spontaneous language. 3.  Pt will improve generative naming to 20/minute for concrete categories and 12 for abstract or initial letter categories. Not met. Pt generates 15/min for concrete categories. 4.  Pt will generate complex explanations with 95% accuracy. Goal met. Pt able to express himself with only occasional pauses. 5.  Pt will give presentation on work related topics with 90% accuracy without obvious hesitations, revisions, word errors. Pt did not complete this task in therapy. He reports no difficulty doing this while working. 6.  Pt will name 4 memory strategies and give examples on when to use each with 90% accuracy. Not met. Pt uses writing for main recall strategy. 7.  Pt will use strategies to recall paragraph with 90% accuracy. Not met. 8.  Pt will participate in further assessment of memory and other cognitive tasks as warranted. Goal met. 9.  Pt will recall 5 unrelated words, parts of message or pieces of information with 90% accuracy after 5 minute delay. Goal met. Recalled 5 unrelated words with 100% accuracy without use of strategies. 10.  Pt will complete executive function tasks with min cues with 100% accuracy. Goal met. Design generation task from CLQT increased from 2 to 9. Plan: Discharge from therapy at this time.     Skilled Services: cognitive communication speech therapy    Charges: 92011         Total Treatment Time: 48 min    David Cordero MA/GEN-SLP  Speech Language Pathologist  [facility]  92. [phone]

## 2022-09-06 ENCOUNTER — APPOINTMENT (OUTPATIENT)
Dept: SPEECH THERAPY | Facility: HOSPITAL | Age: 47
End: 2022-09-06
Attending: Other
Payer: MEDICAID

## 2022-09-07 ENCOUNTER — OFFICE VISIT (OUTPATIENT)
Dept: PHYSICAL THERAPY | Facility: HOSPITAL | Age: 47
End: 2022-09-07
Attending: FAMILY MEDICINE
Payer: MEDICAID

## 2022-09-07 PROCEDURE — 97110 THERAPEUTIC EXERCISES: CPT

## 2022-09-07 PROCEDURE — 97140 MANUAL THERAPY 1/> REGIONS: CPT

## 2022-09-07 NOTE — PROGRESS NOTES
Diagnosis:   post-concussion, headache, imbalance, neck pain   Referring Provider: Espinoza Emmanuel  Date of Evaluation:    6/1/2022    Precautions:  hx of concussion Next MD visit:   none scheduled  Date of Surgery: n/a   Insurance Primary/Secondary: BLUE CROSS MEDICAID / N/A     # Auth Visits: 4            Subjective:  Neck feeling great since last session. No pain. Pain: 0/10 end range neck rotation bilat      Objective:     C/S AROM:  Flexion: WNL  Ext: 45  Rotation: R 73, L 67 improved to 70 deg pain-free bilat. SB: R 20 pain, L 20 pain    Posture: FWD head, sub cranial extension, increased T/S kyphosis, depressed/downwardly rotated scap    Jt restrictions: C1-2 up and FWD L, CT junction gapping bilat, C2-3 up and fwd on the R.    Palpation: Mild TTP along upper C/S paraspinals. Assessment: Neck rotation mobility normalizing with manual therapy and functional stretching. Lacking endurance of Deep intrinsic stabilizers of neck in neutral head/neck positions. Goals:   New goal (8/19): Be able to view blind spots pain-free to drive safely. New goal (8/19): Turning head and neck without difficulty and max 3/10 pain. New goal (8/9): Pt will be able to perform visual scanning/functional head turns without increase in pain or nausea to facilitate improved tolerance to tasks such as driving and navigating complex environments. New goal (8/9): Pt will improve neck disability index score by at least 6 points to reflect improved tolerance to functional and work activities in order to facilitate full return to PLOF. Plan: Continue manual therapy to address jt restrictions, progress neck stabilization. ADD PIVOT PRONES AND WALL ROBOTS.     Date: 8/19/2022  TX#: 1/4 Date:  9/7/2022      TX#: 2/4 Date:                 TX#: 3/ Date:                 TX#: 4/   MT  -CT junction harmonics  -Prone CT junction manip R  -Seated CT junction distraction manip  -C1-2 PIVMs up and fwd bilat  -MWM C1-2 rotation bilat MT  -CT junction harmonics  -Seated CT junction rotational manip  -C1-2 PIVMs up and fwd bilat  -C2-3 up and FWD manip R  -FMP supine cervical rotation tissue technique     EX  -Test and measures taken  -Education on anatomy of condition  -towel rotation stretching 2x5 bilat hold 5 sec  -CT junction self mobs sags x10 hold 5 sec EX  -Biofeedback chin tucks x10 hold 10 sec  -PJ neck retraction x10 hold 10 sec  -Supine neck rotation in axis x20 bilat  -PJ neck retraction, chin tuck rotation 2x10 bilat  -Table push ups with neutral neck retraction x10     HEP - handout created and provided for exs above HEP - added chin tucks, PJ neck retraction, Neck retraction rotation             Charges: MT, EX 2       Total Timed Treatment: MT 15 min, EX 25 min  Total Treatment Time: 40 min

## 2022-09-21 ENCOUNTER — OFFICE VISIT (OUTPATIENT)
Dept: PHYSICAL THERAPY | Facility: HOSPITAL | Age: 47
End: 2022-09-21
Attending: FAMILY MEDICINE
Payer: MEDICAID

## 2022-09-21 DIAGNOSIS — G89.29 CHRONIC MIDLINE BACK PAIN, UNSPECIFIED BACK LOCATION: ICD-10-CM

## 2022-09-21 DIAGNOSIS — M54.9 CHRONIC MIDLINE BACK PAIN, UNSPECIFIED BACK LOCATION: ICD-10-CM

## 2022-09-21 DIAGNOSIS — F07.81 POST CONCUSSION SYNDROME: ICD-10-CM

## 2022-09-21 PROCEDURE — 97140 MANUAL THERAPY 1/> REGIONS: CPT

## 2022-09-21 PROCEDURE — 97110 THERAPEUTIC EXERCISES: CPT

## 2022-09-21 NOTE — PROGRESS NOTES
Diagnosis:   post-concussion, headache, imbalance, neck pain   Referring Provider: Og Danielle  Date of Evaluation:    6/1/2022    Precautions:  hx of concussion Next MD visit:   none scheduled  Date of Surgery: n/a   Insurance Primary/Secondary: BLUE CROSS MEDICAID / N/A     # Auth Visits: 4            Subjective:  States neck is tight, but overall mobility is better. Denies any pain. Pain: 0/10 end range neck rotation bilat      Objective:     C/S AROM:  Flexion: WNL  Ext: 45  Rotation: R 73, L 67 improved to 70 deg pain-free bilat. SB: R 20 pain, L 20 pain    Posture: FWD head, sub cranial extension, increased T/S kyphosis, depressed/downwardly rotated scap    Jt restrictions: C1-2 up and FWD L, CT junction gapping bilat, C2-3 up and fwd on the R.    Palpation: Mild TTP along upper C/S paraspinals. Assessment: Able to progress towards progressive scapular stabilization and dissociation with C/S. Lacking control into posterior depression and requires extensive cuing to facilitate proper scap retraction with mid/low trap activation. Responded well to PNF to improve recruitment. Goals:   New goal (8/19): Be able to view blind spots pain-free to drive safely. New goal (8/19): Turning head and neck without difficulty and max 3/10 pain. New goal (8/9): Pt will be able to perform visual scanning/functional head turns without increase in pain or nausea to facilitate improved tolerance to tasks such as driving and navigating complex environments. New goal (8/9): Pt will improve neck disability index score by at least 6 points to reflect improved tolerance to functional and work activities in order to facilitate full return to PLOF. Plan: DC next visit if doing well.      Date: 8/19/2022  TX#: 1/4 Date:  9/7/2022      TX#: 2/4 Date: 9/21/2022             TX#: 3/4 Date:                 TX#: 4/   MT  -CT junction harmonics  -Prone CT junction manip R  -Seated CT junction distraction manip  -C1-2 PIVMs up and fwd bilat  -MWM C1-2 rotation bilat MT  -CT junction harmonics  -Seated CT junction rotational manip  -C1-2 PIVMs up and fwd bilat  -C2-3 up and FWD manip R  -FMP supine cervical rotation tissue technique MT  -CT junction harmonics  -Seated CT junction rotational manip  -C1-2 PIVMs up and fwd bilat  -C2-3 up and FWD manip R  -FMP supine cervical rotation tissue technique    EX  -Test and measures taken  -Education on anatomy of condition  -towel rotation stretching 2x5 bilat hold 5 sec  -CT junction self mobs sags x10 hold 5 sec EX  -Biofeedback chin tucks x10 hold 10 sec  -PJ neck retraction x10 hold 10 sec  -Supine neck rotation in axis x20 bilat  -PJ neck retraction, chin tuck rotation 2x10 bilat  -Table push ups with neutral neck retraction x10 EX  -scap posterior depression with sustained holds and combination of isotonics (8 min)  -PJ neck retraction, chin tuck rotation 2x10 bilat  -Prone A's with scap retraction x7 hold 10 sec  -Wall butterflies 2x10  -Bent over rows hold x10 hold 3 sec  -Standing SA rows x10 hold 3 sec #4 plate    HEP - handout created and provided for exs above HEP - added chin tucks, PJ neck retraction, Neck retraction rotation             Charges: MT, EX 2       Total Timed Treatment: MT 15 min, EX 25 min  Total Treatment Time: 40 min

## 2022-09-27 ENCOUNTER — APPOINTMENT (OUTPATIENT)
Dept: PHYSICAL THERAPY | Facility: HOSPITAL | Age: 47
End: 2022-09-27
Attending: FAMILY MEDICINE
Payer: MEDICAID

## 2022-10-05 ENCOUNTER — OFFICE VISIT (OUTPATIENT)
Dept: PHYSICAL THERAPY | Facility: HOSPITAL | Age: 47
End: 2022-10-05
Attending: FAMILY MEDICINE
Payer: MEDICAID

## 2022-10-05 PROCEDURE — 97110 THERAPEUTIC EXERCISES: CPT

## 2022-10-05 PROCEDURE — 97140 MANUAL THERAPY 1/> REGIONS: CPT

## 2022-10-05 NOTE — PROGRESS NOTES
Discharge Summary  Pt has attended 12 visits in Physical Therapy    Diagnosis:   post-concussion, headache, imbalance, neck pain   Referring Provider: Raj Stephens  Date of Evaluation:    6/1/2022    Precautions:  hx of concussion Next MD visit:   none scheduled  Date of Surgery: n/a   Insurance Primary/Secondary: BLUE CROSS MEDICAID / N/A     # Auth Visits: 4            Subjective:  Reports he is 85% better and no longer has any pain or stiffness. Feels he is ready for discharge. Pain: 0/10 end range neck rotation bilat      Objective:     C/S AROM:  Flexion: WNL  Ext: 45  Rotation: R 73, L 67 improved to 70 deg pain-free bilat. SB: R 20 pain, L 20 pain    Posture: FWD head, sub cranial extension, increased T/S kyphosis, depressed/downwardly rotated scap    Jt restrictions: C1-2 up and FWD L, CT junction gapping bilat, C2-3 up and fwd on the R.    Palpation: Mild TTP along upper C/S paraspinals. Assessment: Pt has achieved all established functional goals and has been given an extensive HEP to maintain functional gains. Pt is no longer appropriate for skilled therapy and will be discharged. Goals:   New goal (8/19): Be able to view blind spots pain-free to drive safely. (MET)  New goal (8/19): Turning head and neck without difficulty and max 3/10 pain. (MET)  New goal (8/9): Pt will be able to perform visual scanning/functional head turns without increase in pain or nausea to facilitate improved tolerance to tasks such as driving and navigating complex environments. (MET)  New goal (8/9): Pt will improve neck disability index score by at least 6 points to reflect improved tolerance to functional and work activities in order to facilitate full return to PLOF. (MET)      Plan: D/C with continued compliance to HEP. Thank you for your referral. Please co-sign or sign and return this letter via fax as soon as possible to [phone].  If you have any questions, please contact me at Dept: [phone]. Sincerely,  Electronically signed by therapist: Jeanette Chacon PT    [de-identified] certification required: Yes  I certify the need for these services furnished under this plan of treatment and while under my care. X___________________________________________________ Date____________________    Certification From: 85/0/3384  To:1/3/2023    Date: 10/5/2022  TX#: 4/4   MT  -CT junction harmonics  -C1-2 PIVMs up and fwd bilat  -C2-3 up and FWD manip bilat  -FMP supine cervical rotation tissue technique   EX  -PJ neck segmental flexion/extension training 2x3 slow and controlled  -Seated segmental flexion/extension training 2x3 slow and controlled  -Prone A's with scap retraction x10 hold 10 sec  -Education on side sleeping postures with pillow support.   -TEST AND MEASURES   HEP - reviewed segmental neck stabilization program        Charges: MT, EX 2       Total Timed Treatment: MT 15 min, EX 25 min  Total Treatment Time: 40 min

## 2022-10-12 ENCOUNTER — APPOINTMENT (OUTPATIENT)
Dept: PHYSICAL THERAPY | Facility: HOSPITAL | Age: 47
End: 2022-10-12
Attending: FAMILY MEDICINE
Payer: MEDICAID

## 2022-10-19 ENCOUNTER — APPOINTMENT (OUTPATIENT)
Dept: PHYSICAL THERAPY | Facility: HOSPITAL | Age: 47
End: 2022-10-19
Attending: FAMILY MEDICINE
Payer: MEDICAID

## 2022-10-20 ENCOUNTER — OFFICE VISIT (OUTPATIENT)
Dept: FAMILY MEDICINE CLINIC | Facility: CLINIC | Age: 47
End: 2022-10-20
Payer: MEDICAID

## 2022-10-20 ENCOUNTER — LAB ENCOUNTER (OUTPATIENT)
Dept: LAB | Age: 47
End: 2022-10-20
Attending: FAMILY MEDICINE
Payer: MEDICAID

## 2022-10-20 VITALS
TEMPERATURE: 98 F | SYSTOLIC BLOOD PRESSURE: 101 MMHG | HEART RATE: 71 BPM | BODY MASS INDEX: 27 KG/M2 | DIASTOLIC BLOOD PRESSURE: 69 MMHG | WEIGHT: 170 LBS

## 2022-10-20 DIAGNOSIS — Z12.11 COLON CANCER SCREENING: Primary | ICD-10-CM

## 2022-10-20 DIAGNOSIS — M99.01 CERVICOTHORACIC SOMATIC DYSFUNCTION: ICD-10-CM

## 2022-10-20 DIAGNOSIS — G44.309 POST-TRAUMATIC HEADACHE, NOT INTRACTABLE, UNSPECIFIED CHRONICITY PATTERN: ICD-10-CM

## 2022-10-20 DIAGNOSIS — Z11.3 ROUTINE SCREENING FOR STI (SEXUALLY TRANSMITTED INFECTION): ICD-10-CM

## 2022-10-20 DIAGNOSIS — F07.81 POSTCONCUSSION SYNDROME: ICD-10-CM

## 2022-10-20 DIAGNOSIS — Z00.00 ROUTINE PHYSICAL EXAMINATION: ICD-10-CM

## 2022-10-20 LAB
ALBUMIN SERPL-MCNC: 3.8 G/DL (ref 3.4–5)
ALBUMIN/GLOB SERPL: 1 {RATIO} (ref 1–2)
ALP LIVER SERPL-CCNC: 58 U/L
ALT SERPL-CCNC: 21 U/L
ANION GAP SERPL CALC-SCNC: 7 MMOL/L (ref 0–18)
AST SERPL-CCNC: 15 U/L (ref 15–37)
BASOPHILS # BLD AUTO: 0.02 X10(3) UL (ref 0–0.2)
BASOPHILS NFR BLD AUTO: 0.4 %
BILIRUB SERPL-MCNC: 0.4 MG/DL (ref 0.1–2)
BILIRUB UR QL: NEGATIVE
BUN BLD-MCNC: 18 MG/DL (ref 7–18)
BUN/CREAT SERPL: 18.2 (ref 10–20)
CALCIUM BLD-MCNC: 9.4 MG/DL (ref 8.5–10.1)
CHLORIDE SERPL-SCNC: 102 MMOL/L (ref 98–112)
CHOLEST SERPL-MCNC: 217 MG/DL (ref ?–200)
CLARITY UR: CLEAR
CO2 SERPL-SCNC: 30 MMOL/L (ref 21–32)
COLOR UR: YELLOW
COMPLEXED PSA SERPL-MCNC: 1.12 NG/ML (ref ?–4)
CREAT BLD-MCNC: 0.99 MG/DL
DEPRECATED RDW RBC AUTO: 45.5 FL (ref 35.1–46.3)
EOSINOPHIL # BLD AUTO: 0.13 X10(3) UL (ref 0–0.7)
EOSINOPHIL NFR BLD AUTO: 2.7 %
ERYTHROCYTE [DISTWIDTH] IN BLOOD BY AUTOMATED COUNT: 14.1 % (ref 11–15)
FASTING PATIENT LIPID ANSWER: NO
FASTING STATUS PATIENT QL REPORTED: NO
GFR SERPLBLD BASED ON 1.73 SQ M-ARVRAT: 95 ML/MIN/1.73M2 (ref 60–?)
GLOBULIN PLAS-MCNC: 3.7 G/DL (ref 2.8–4.4)
GLUCOSE BLD-MCNC: 65 MG/DL (ref 70–99)
GLUCOSE UR-MCNC: NEGATIVE MG/DL
HCT VFR BLD AUTO: 47.7 %
HDLC SERPL-MCNC: 53 MG/DL (ref 40–59)
HGB BLD-MCNC: 15.9 G/DL
HGB UR QL STRIP.AUTO: NEGATIVE
IMM GRANULOCYTES # BLD AUTO: 0 X10(3) UL (ref 0–1)
IMM GRANULOCYTES NFR BLD: 0 %
KETONES UR-MCNC: NEGATIVE MG/DL
LDLC SERPL CALC-MCNC: 151 MG/DL (ref ?–100)
LEUKOCYTE ESTERASE UR QL STRIP.AUTO: NEGATIVE
LYMPHOCYTES # BLD AUTO: 2.5 X10(3) UL (ref 1–4)
LYMPHOCYTES NFR BLD AUTO: 51.4 %
MCH RBC QN AUTO: 29.5 PG (ref 26–34)
MCHC RBC AUTO-ENTMCNC: 33.3 G/DL (ref 31–37)
MCV RBC AUTO: 88.5 FL
MONOCYTES # BLD AUTO: 0.32 X10(3) UL (ref 0.1–1)
MONOCYTES NFR BLD AUTO: 6.6 %
NEUTROPHILS # BLD AUTO: 1.89 X10 (3) UL (ref 1.5–7.7)
NEUTROPHILS # BLD AUTO: 1.89 X10(3) UL (ref 1.5–7.7)
NEUTROPHILS NFR BLD AUTO: 38.9 %
NITRITE UR QL STRIP.AUTO: NEGATIVE
NONHDLC SERPL-MCNC: 164 MG/DL (ref ?–130)
OSMOLALITY SERPL CALC.SUM OF ELEC: 288 MOSM/KG (ref 275–295)
PH UR: 7 [PH] (ref 5–8)
PLATELET # BLD AUTO: 271 10(3)UL (ref 150–450)
POTASSIUM SERPL-SCNC: 3.9 MMOL/L (ref 3.5–5.1)
PROT SERPL-MCNC: 7.5 G/DL (ref 6.4–8.2)
PROT UR-MCNC: NEGATIVE MG/DL
RBC # BLD AUTO: 5.39 X10(6)UL
SODIUM SERPL-SCNC: 139 MMOL/L (ref 136–145)
SP GR UR STRIP: 1.02 (ref 1–1.03)
TRIGL SERPL-MCNC: 73 MG/DL (ref 30–149)
TSI SER-ACNC: 1.06 MIU/ML (ref 0.36–3.74)
UROBILINOGEN UR STRIP-ACNC: 0.2
VLDLC SERPL CALC-MCNC: 14 MG/DL (ref 0–30)
WBC # BLD AUTO: 4.9 X10(3) UL (ref 4–11)

## 2022-10-20 PROCEDURE — 85025 COMPLETE CBC W/AUTO DIFF WBC: CPT

## 2022-10-20 PROCEDURE — 84443 ASSAY THYROID STIM HORMONE: CPT

## 2022-10-20 PROCEDURE — 87389 HIV-1 AG W/HIV-1&-2 AB AG IA: CPT

## 2022-10-20 PROCEDURE — 80061 LIPID PANEL: CPT

## 2022-10-20 PROCEDURE — 3074F SYST BP LT 130 MM HG: CPT | Performed by: FAMILY MEDICINE

## 2022-10-20 PROCEDURE — 99396 PREV VISIT EST AGE 40-64: CPT | Performed by: FAMILY MEDICINE

## 2022-10-20 PROCEDURE — 87491 CHLMYD TRACH DNA AMP PROBE: CPT

## 2022-10-20 PROCEDURE — 98925 OSTEOPATH MANJ 1-2 REGIONS: CPT | Performed by: FAMILY MEDICINE

## 2022-10-20 PROCEDURE — 99214 OFFICE O/P EST MOD 30 MIN: CPT | Performed by: FAMILY MEDICINE

## 2022-10-20 PROCEDURE — 36415 COLL VENOUS BLD VENIPUNCTURE: CPT

## 2022-10-20 PROCEDURE — 80053 COMPREHEN METABOLIC PANEL: CPT

## 2022-10-20 PROCEDURE — 86780 TREPONEMA PALLIDUM: CPT

## 2022-10-20 PROCEDURE — 3078F DIAST BP <80 MM HG: CPT | Performed by: FAMILY MEDICINE

## 2022-10-20 PROCEDURE — 81003 URINALYSIS AUTO W/O SCOPE: CPT

## 2022-10-20 PROCEDURE — 87591 N.GONORRHOEAE DNA AMP PROB: CPT

## 2022-10-21 LAB
C TRACH DNA SPEC QL NAA+PROBE: NEGATIVE
N GONORRHOEA DNA SPEC QL NAA+PROBE: NEGATIVE
T PALLIDUM AB SER QL: NEGATIVE

## 2022-11-08 ENCOUNTER — MED REC SCAN ONLY (OUTPATIENT)
Dept: NEUROLOGY | Facility: CLINIC | Age: 47
End: 2022-11-08

## 2022-12-29 ENCOUNTER — TELEPHONE (OUTPATIENT)
Dept: NEUROLOGY | Facility: CLINIC | Age: 47
End: 2022-12-29

## 2023-08-16 ENCOUNTER — OFFICE VISIT (OUTPATIENT)
Dept: NEUROLOGY | Facility: CLINIC | Age: 48
End: 2023-08-16
Payer: MEDICAID

## 2023-08-16 VITALS — BODY MASS INDEX: 25 KG/M2 | WEIGHT: 165 LBS

## 2023-08-16 DIAGNOSIS — H93.233 HEARING ABNORMALLY ACUTE, BILATERAL: ICD-10-CM

## 2023-08-16 DIAGNOSIS — G44.321 INTRACTABLE CHRONIC POST-TRAUMATIC HEADACHE: Primary | ICD-10-CM

## 2023-08-16 PROCEDURE — 99214 OFFICE O/P EST MOD 30 MIN: CPT | Performed by: OTHER

## 2023-08-16 RX ORDER — RIZATRIPTAN BENZOATE 10 MG/1
TABLET ORAL
Qty: 12 TABLET | Refills: 5 | Status: SHIPPED | OUTPATIENT
Start: 2023-08-16

## 2023-08-16 RX ORDER — PROPRANOLOL HYDROCHLORIDE 20 MG/1
TABLET ORAL
Qty: 120 TABLET | Refills: 3 | Status: SHIPPED | OUTPATIENT
Start: 2023-08-16
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
## 2023-08-17 ENCOUNTER — PATIENT MESSAGE (OUTPATIENT)
Dept: FAMILY MEDICINE CLINIC | Facility: CLINIC | Age: 48
End: 2023-08-17

## 2023-08-17 DIAGNOSIS — Z11.3 ROUTINE SCREENING FOR STI (SEXUALLY TRANSMITTED INFECTION): ICD-10-CM

## 2023-08-17 DIAGNOSIS — Z00.00 HEALTHY ADULT ON ROUTINE PHYSICAL EXAMINATION: Primary | ICD-10-CM

## 2023-08-18 NOTE — TELEPHONE ENCOUNTER
From: Anshul Campbell  To: Elena Caicedo DO  Sent: 8/17/2023 12:31 PM CDT  Subject: Chetna Reina,    Is it possible to get my annual Blood work, STD, and HIV testing done before my scheduled appointment in December?

## 2023-08-18 NOTE — TELEPHONE ENCOUNTER
Dr. Isidoro Gottron - scheduled for 12/5/23 Physical.   Patient asking to completed tests prior to appointment= to discuss at appointment. Requesting = blood work, STD testing, HIV testing. Please advise.  Thank you

## 2023-08-20 NOTE — TELEPHONE ENCOUNTER
All lab orders including STI screens have been placed on the chart, but let the patient know he is not coming get any of these test done prior to 3 months. These labs will only be pertinent to his visit if he gets them done close to his appointment time. Thank you.

## 2023-08-24 ENCOUNTER — OFFICE VISIT (OUTPATIENT)
Dept: OTOLARYNGOLOGY | Facility: CLINIC | Age: 48
End: 2023-08-24

## 2023-08-24 ENCOUNTER — OFFICE VISIT (OUTPATIENT)
Dept: AUDIOLOGY | Facility: CLINIC | Age: 48
End: 2023-08-24

## 2023-08-24 VITALS — WEIGHT: 165 LBS | BODY MASS INDEX: 25 KG/M2

## 2023-08-24 DIAGNOSIS — H91.93 BILATERAL HEARING LOSS, UNSPECIFIED HEARING LOSS TYPE: Primary | ICD-10-CM

## 2023-08-24 DIAGNOSIS — Z01.10 ENCOUNTER FOR EXAM OF EARS AND HEARING W/O ABNORMAL FINDINGS: Primary | ICD-10-CM

## 2023-08-24 DIAGNOSIS — H93.233 HYPERACUSIS OF BOTH EARS: ICD-10-CM

## 2023-08-24 PROCEDURE — 92567 TYMPANOMETRY: CPT | Performed by: AUDIOLOGIST

## 2023-08-24 PROCEDURE — 92557 COMPREHENSIVE HEARING TEST: CPT | Performed by: AUDIOLOGIST

## 2023-08-24 RX ORDER — MELOXICAM 15 MG/1
15 TABLET ORAL DAILY
Qty: 30 TABLET | Refills: 3 | Status: SHIPPED | OUTPATIENT
Start: 2023-08-24

## 2023-08-24 RX ORDER — CYCLOBENZAPRINE HCL 5 MG
5 TABLET ORAL NIGHTLY
Qty: 30 TABLET | Refills: 1 | Status: SHIPPED | OUTPATIENT
Start: 2023-08-24

## 2023-08-25 ENCOUNTER — TELEPHONE (OUTPATIENT)
Dept: NEUROLOGY | Facility: CLINIC | Age: 48
End: 2023-08-25

## 2023-12-05 ENCOUNTER — LAB ENCOUNTER (OUTPATIENT)
Dept: LAB | Age: 48
End: 2023-12-05
Attending: FAMILY MEDICINE
Payer: MEDICAID

## 2023-12-05 ENCOUNTER — OFFICE VISIT (OUTPATIENT)
Dept: FAMILY MEDICINE CLINIC | Facility: CLINIC | Age: 48
End: 2023-12-05
Payer: MEDICAID

## 2023-12-05 VITALS
BODY MASS INDEX: 24.71 KG/M2 | SYSTOLIC BLOOD PRESSURE: 96 MMHG | HEART RATE: 60 BPM | DIASTOLIC BLOOD PRESSURE: 60 MMHG | WEIGHT: 163 LBS | HEIGHT: 68 IN | TEMPERATURE: 98 F

## 2023-12-05 DIAGNOSIS — Z00.00 ROUTINE PHYSICAL EXAMINATION: ICD-10-CM

## 2023-12-05 DIAGNOSIS — Z28.21 INFLUENZA VACCINATION DECLINED BY PATIENT: ICD-10-CM

## 2023-12-05 DIAGNOSIS — Z28.21 TETANUS, DIPHTHERIA, AND ACELLULAR PERTUSSIS (TDAP) VACCINATION DECLINED: ICD-10-CM

## 2023-12-05 DIAGNOSIS — Z12.11 COLON CANCER SCREENING: Primary | ICD-10-CM

## 2023-12-05 DIAGNOSIS — Z00.00 HEALTHY ADULT ON ROUTINE PHYSICAL EXAMINATION: ICD-10-CM

## 2023-12-05 DIAGNOSIS — M75.21 BICIPITAL TENDINITIS, RIGHT: ICD-10-CM

## 2023-12-05 DIAGNOSIS — Z11.3 ROUTINE SCREENING FOR STI (SEXUALLY TRANSMITTED INFECTION): ICD-10-CM

## 2023-12-05 DIAGNOSIS — Z28.21 PNEUMOCOCCAL VACCINATION DECLINED BY PATIENT: ICD-10-CM

## 2023-12-05 LAB
ALBUMIN SERPL-MCNC: 4.4 G/DL (ref 3.2–4.8)
ALBUMIN/GLOB SERPL: 1.5 {RATIO} (ref 1–2)
ALP LIVER SERPL-CCNC: 60 U/L
ALT SERPL-CCNC: 21 U/L
ANION GAP SERPL CALC-SCNC: 5 MMOL/L (ref 0–18)
AST SERPL-CCNC: 19 U/L (ref ?–34)
BASOPHILS # BLD AUTO: 0.01 X10(3) UL (ref 0–0.2)
BASOPHILS NFR BLD AUTO: 0.2 %
BILIRUB SERPL-MCNC: 0.7 MG/DL (ref 0.3–1.2)
BILIRUB UR QL: NEGATIVE
BUN BLD-MCNC: 9 MG/DL (ref 9–23)
BUN/CREAT SERPL: 9.7 (ref 10–20)
CALCIUM BLD-MCNC: 10 MG/DL (ref 8.7–10.4)
CHLORIDE SERPL-SCNC: 100 MMOL/L (ref 98–112)
CHOLEST SERPL-MCNC: 224 MG/DL (ref ?–200)
CLARITY UR: CLEAR
CO2 SERPL-SCNC: 33 MMOL/L (ref 21–32)
COLOR UR: COLORLESS
COMPLEXED PSA SERPL-MCNC: 0.86 NG/ML (ref ?–4)
CREAT BLD-MCNC: 0.93 MG/DL
DEPRECATED RDW RBC AUTO: 46.2 FL (ref 35.1–46.3)
EGFRCR SERPLBLD CKD-EPI 2021: 102 ML/MIN/1.73M2 (ref 60–?)
EOSINOPHIL # BLD AUTO: 0.14 X10(3) UL (ref 0–0.7)
EOSINOPHIL NFR BLD AUTO: 3.3 %
ERYTHROCYTE [DISTWIDTH] IN BLOOD BY AUTOMATED COUNT: 14.3 % (ref 11–15)
FASTING PATIENT LIPID ANSWER: YES
FASTING STATUS PATIENT QL REPORTED: YES
GLOBULIN PLAS-MCNC: 3 G/DL (ref 2.8–4.4)
GLUCOSE BLD-MCNC: 81 MG/DL (ref 70–99)
GLUCOSE UR-MCNC: NORMAL MG/DL
HAV AB SER QL IA: NONREACTIVE
HBV CORE AB SERPL QL IA: NONREACTIVE
HBV SURFACE AB SER QL: NONREACTIVE
HBV SURFACE AB SERPL IA-ACNC: <3.1 MIU/ML
HBV SURFACE AG SER-ACNC: <0.1 [IU]/L
HBV SURFACE AG SERPL QL IA: NONREACTIVE
HBV SURFACE AG SERPL QL IA: NONREACTIVE
HCT VFR BLD AUTO: 47.7 %
HCV AB SERPL QL IA: NONREACTIVE
HCV AB SERPL QL IA: NONREACTIVE
HDLC SERPL-MCNC: 60 MG/DL (ref 40–59)
HGB BLD-MCNC: 15.9 G/DL
HGB UR QL STRIP.AUTO: NEGATIVE
IMM GRANULOCYTES # BLD AUTO: 0.01 X10(3) UL (ref 0–1)
IMM GRANULOCYTES NFR BLD: 0.2 %
KETONES UR-MCNC: NEGATIVE MG/DL
LDLC SERPL CALC-MCNC: 145 MG/DL (ref ?–100)
LEUKOCYTE ESTERASE UR QL STRIP.AUTO: NEGATIVE
LYMPHOCYTES # BLD AUTO: 2.19 X10(3) UL (ref 1–4)
LYMPHOCYTES NFR BLD AUTO: 52 %
MCH RBC QN AUTO: 29.5 PG (ref 26–34)
MCHC RBC AUTO-ENTMCNC: 33.3 G/DL (ref 31–37)
MCV RBC AUTO: 88.5 FL
MONOCYTES # BLD AUTO: 0.34 X10(3) UL (ref 0.1–1)
MONOCYTES NFR BLD AUTO: 8.1 %
NEUTROPHILS # BLD AUTO: 1.52 X10 (3) UL (ref 1.5–7.7)
NEUTROPHILS # BLD AUTO: 1.52 X10(3) UL (ref 1.5–7.7)
NEUTROPHILS NFR BLD AUTO: 36.2 %
NITRITE UR QL STRIP.AUTO: NEGATIVE
NONHDLC SERPL-MCNC: 164 MG/DL (ref ?–130)
OSMOLALITY SERPL CALC.SUM OF ELEC: 284 MOSM/KG (ref 275–295)
PH UR: 7.5 [PH] (ref 5–8)
PLATELET # BLD AUTO: 311 10(3)UL (ref 150–450)
POTASSIUM SERPL-SCNC: 4.2 MMOL/L (ref 3.5–5.1)
PROT SERPL-MCNC: 7.4 G/DL (ref 5.7–8.2)
PROT UR-MCNC: NEGATIVE MG/DL
RBC # BLD AUTO: 5.39 X10(6)UL
SODIUM SERPL-SCNC: 138 MMOL/L (ref 136–145)
SP GR UR STRIP: 1.01 (ref 1–1.03)
TRIGL SERPL-MCNC: 106 MG/DL (ref 30–149)
TSI SER-ACNC: 0.98 MIU/ML (ref 0.55–4.78)
UROBILINOGEN UR STRIP-ACNC: NORMAL
VLDLC SERPL CALC-MCNC: 20 MG/DL (ref 0–30)
WBC # BLD AUTO: 4.2 X10(3) UL (ref 4–11)

## 2023-12-05 PROCEDURE — 80053 COMPREHEN METABOLIC PANEL: CPT

## 2023-12-05 PROCEDURE — 80061 LIPID PANEL: CPT

## 2023-12-05 PROCEDURE — 87591 N.GONORRHOEAE DNA AMP PROB: CPT

## 2023-12-05 PROCEDURE — 36415 COLL VENOUS BLD VENIPUNCTURE: CPT

## 2023-12-05 PROCEDURE — 87340 HEPATITIS B SURFACE AG IA: CPT

## 2023-12-05 PROCEDURE — 86704 HEP B CORE ANTIBODY TOTAL: CPT

## 2023-12-05 PROCEDURE — 3074F SYST BP LT 130 MM HG: CPT | Performed by: FAMILY MEDICINE

## 2023-12-05 PROCEDURE — 87389 HIV-1 AG W/HIV-1&-2 AB AG IA: CPT

## 2023-12-05 PROCEDURE — 85025 COMPLETE CBC W/AUTO DIFF WBC: CPT

## 2023-12-05 PROCEDURE — 3008F BODY MASS INDEX DOCD: CPT | Performed by: FAMILY MEDICINE

## 2023-12-05 PROCEDURE — 86706 HEP B SURFACE ANTIBODY: CPT

## 2023-12-05 PROCEDURE — 86803 HEPATITIS C AB TEST: CPT

## 2023-12-05 PROCEDURE — 80503 PATH CLIN CONSLTJ SF 5-20: CPT

## 2023-12-05 PROCEDURE — 86708 HEPATITIS A ANTIBODY: CPT

## 2023-12-05 PROCEDURE — 3078F DIAST BP <80 MM HG: CPT | Performed by: FAMILY MEDICINE

## 2023-12-05 PROCEDURE — 81003 URINALYSIS AUTO W/O SCOPE: CPT

## 2023-12-05 PROCEDURE — 99396 PREV VISIT EST AGE 40-64: CPT | Performed by: FAMILY MEDICINE

## 2023-12-05 PROCEDURE — 87491 CHLMYD TRACH DNA AMP PROBE: CPT

## 2023-12-05 PROCEDURE — 86780 TREPONEMA PALLIDUM: CPT

## 2023-12-05 PROCEDURE — 84443 ASSAY THYROID STIM HORMONE: CPT

## 2023-12-05 NOTE — PATIENT INSTRUCTIONS
All adult screening ordered and done appropriate for patient's age and gender and risk factors and complaints. Encouraged physical fitness and daily physical activity daily. Monitor blood pressures and record at home. Limit salt intake. Patient educated on biceps tendinitis. Information sent to the patient's MyChart and should be on the after visit summary as well.

## 2024-01-18 ENCOUNTER — OFFICE VISIT (OUTPATIENT)
Dept: NEUROLOGY | Facility: CLINIC | Age: 49
End: 2024-01-18
Payer: MEDICAID

## 2024-01-18 VITALS — BODY MASS INDEX: 24.71 KG/M2 | WEIGHT: 163 LBS | HEIGHT: 68 IN

## 2024-01-18 DIAGNOSIS — G44.321 INTRACTABLE CHRONIC POST-TRAUMATIC HEADACHE: Primary | ICD-10-CM

## 2024-01-18 DIAGNOSIS — F07.81 POST CONCUSSION SYNDROME: ICD-10-CM

## 2024-01-18 PROCEDURE — 3008F BODY MASS INDEX DOCD: CPT | Performed by: OTHER

## 2024-01-18 PROCEDURE — 99214 OFFICE O/P EST MOD 30 MIN: CPT | Performed by: OTHER

## 2024-01-18 RX ORDER — TOPIRAMATE 25 MG/1
TABLET ORAL
Qty: 90 TABLET | Refills: 5 | Status: SHIPPED | OUTPATIENT
Start: 2024-01-18
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
## 2024-03-25 ENCOUNTER — TELEPHONE (OUTPATIENT)
Dept: NEUROLOGY | Facility: CLINIC | Age: 49
End: 2024-03-25

## 2024-03-25 ENCOUNTER — TELEPHONE (OUTPATIENT)
Dept: OTOLARYNGOLOGY | Facility: CLINIC | Age: 49
End: 2024-03-25

## 2024-03-25 NOTE — TELEPHONE ENCOUNTER
Christina patient representative from [facility] [employer] calling stated Patient  would like schedule  a brief telephone conference  with Dr Summers.Please advise

## 2024-03-25 NOTE — TELEPHONE ENCOUNTER
Tiki from the law office of [employer] called looking to schedule a teleconference with provider and pt. Please advise if he will do a teleconference

## 2024-03-25 NOTE — TELEPHONE ENCOUNTER
Dr. Felix, please advise if this is something you are willing to do, and/or if this should be managed up to Dianna Acosta.  Reviewed and electronically signed by: KORY Esteves

## 2024-03-27 NOTE — TELEPHONE ENCOUNTER
Christina f/u on message below, JENNIFER did inform Christina that msg was routed by RN to Dr. Summers and are awaiting his response, Christina thankful for information and will await response.

## 2024-03-28 NOTE — TELEPHONE ENCOUNTER
Spoke with Christina, stated to please call her directly when Dr. Summers is back in office. At [phone]. Christina would like to set up appointment with Dr. Summers and discuss care of patient.

## 2024-03-28 NOTE — TELEPHONE ENCOUNTER
[facility] was called to receive more information to inform Dr. Summers.

## 2024-03-28 NOTE — TELEPHONE ENCOUNTER
Please find out from nurse manager who is now in charge of setting up depositions for physicians . This person cam communicate with helga and set up a time for a deposition.

## 2024-07-10 ENCOUNTER — OFFICE VISIT (OUTPATIENT)
Dept: NEUROLOGY | Facility: CLINIC | Age: 49
End: 2024-07-10
Payer: MEDICAID

## 2024-07-10 VITALS — BODY MASS INDEX: 26.68 KG/M2 | HEIGHT: 67 IN | WEIGHT: 170 LBS

## 2024-07-10 DIAGNOSIS — G44.321 INTRACTABLE CHRONIC POST-TRAUMATIC HEADACHE: Primary | ICD-10-CM

## 2024-07-10 DIAGNOSIS — F07.81 POST CONCUSSION SYNDROME: ICD-10-CM

## 2024-07-10 PROCEDURE — 99214 OFFICE O/P EST MOD 30 MIN: CPT | Performed by: OTHER

## 2024-07-10 RX ORDER — TOPIRAMATE 25 MG/1
TABLET ORAL
Qty: 540 TABLET | Refills: 3 | Status: SHIPPED | OUTPATIENT
Start: 2024-07-10

## 2024-07-10 RX ORDER — RIZATRIPTAN BENZOATE 10 MG/1
TABLET ORAL
Qty: 12 TABLET | Refills: 5 | Status: SHIPPED | OUTPATIENT
Start: 2024-07-10
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
## 2024-08-27 ENCOUNTER — TELEPHONE (OUTPATIENT)
Dept: FAMILY MEDICINE CLINIC | Facility: CLINIC | Age: 49
End: 2024-08-27

## 2024-08-27 DIAGNOSIS — J45.40 MODERATE PERSISTENT ASTHMA WITHOUT COMPLICATION (HCC): ICD-10-CM

## 2024-08-27 DIAGNOSIS — J45.909 UNCOMPLICATED ASTHMA, UNSPECIFIED ASTHMA SEVERITY, UNSPECIFIED WHETHER PERSISTENT (HCC): Primary | ICD-10-CM
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
## 2024-08-28 RX ORDER — ALBUTEROL SULFATE 0.83 MG/ML
2.5 SOLUTION RESPIRATORY (INHALATION) EVERY 6 HOURS PRN
Qty: 30 EACH | Refills: 11 | Status: SHIPPED | OUTPATIENT
Start: 2024-08-28 | End: 2024-11-26

## 2024-08-28 RX ORDER — ALBUTEROL SULFATE 90 UG/1
2 AEROSOL, METERED RESPIRATORY (INHALATION) EVERY 6 HOURS PRN
Qty: 2 EACH | Refills: 2 | Status: SHIPPED | OUTPATIENT
Start: 2024-08-28

## 2024-09-06 ENCOUNTER — TELEPHONE (OUTPATIENT)
Dept: FAMILY MEDICINE CLINIC | Facility: CLINIC | Age: 49
End: 2024-09-06

## 2024-09-06 DIAGNOSIS — J45.40 MODERATE PERSISTENT ASTHMA WITHOUT COMPLICATION (HCC): Primary | ICD-10-CM

## 2024-09-06 PROCEDURE — 99070 SPECIAL SUPPLIES PHYS/QHP: CPT | Performed by: FAMILY MEDICINE

## 2024-09-07 NOTE — TELEPHONE ENCOUNTER
There is a prescription that has been placed on the chart and I believe sent to the pharmacy for the patient's nebulizer kit/mask.  Please call him and let him know.  If it is not found at the pharmacy, the patient can come and  hardcopy for this prescription at the [facility].  Thank you.

## 2024-09-16 NOTE — TELEPHONE ENCOUNTER
1st attempt - left message to call back.  See message below. Patient missed his appointment this morning.

## 2024-09-16 NOTE — TELEPHONE ENCOUNTER
The order needs to be placed to DME company. Patient will need a visit or purchase out of pocket.

## 2024-09-18 NOTE — TELEPHONE ENCOUNTER
Please reach out to the patient again, and if you cannot get him please send the patient a letter that we are trying to be in touch with him and move his patient assessment and care along.

## 2024-09-25 ENCOUNTER — OFFICE VISIT (OUTPATIENT)
Dept: NEUROLOGY | Facility: CLINIC | Age: 49
End: 2024-09-25
Payer: MEDICAID

## 2024-09-25 DIAGNOSIS — M54.9 CHRONIC MIDLINE BACK PAIN, UNSPECIFIED BACK LOCATION: ICD-10-CM

## 2024-09-25 DIAGNOSIS — F07.81 POST CONCUSSION SYNDROME: ICD-10-CM

## 2024-09-25 DIAGNOSIS — G44.321 INTRACTABLE CHRONIC POST-TRAUMATIC HEADACHE: Primary | ICD-10-CM

## 2024-09-25 DIAGNOSIS — G89.29 CHRONIC MIDLINE BACK PAIN, UNSPECIFIED BACK LOCATION: ICD-10-CM

## 2024-09-25 PROCEDURE — 99213 OFFICE O/P EST LOW 20 MIN: CPT | Performed by: OTHER

## 2024-09-25 RX ORDER — RIZATRIPTAN BENZOATE 10 MG/1
TABLET ORAL
Qty: 12 TABLET | Refills: 5 | Status: SHIPPED | OUTPATIENT
Start: 2024-09-25

## 2024-09-25 RX ORDER — TOPIRAMATE 25 MG/1
TABLET, FILM COATED ORAL
Qty: 540 TABLET | Refills: 3 | Status: SHIPPED | OUTPATIENT
Start: 2024-09-25
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
## 2025-04-04 ENCOUNTER — APPOINTMENT (OUTPATIENT)
Dept: GENERAL RADIOLOGY | Facility: HOSPITAL | Age: 50
End: 2025-04-04
Attending: EMERGENCY MEDICINE
Payer: MEDICAID

## 2025-04-04 ENCOUNTER — HOSPITAL ENCOUNTER (EMERGENCY)
Facility: HOSPITAL | Age: 50
Discharge: HOME | End: 2025-04-04
Attending: EMERGENCY MEDICINE
Payer: MEDICAID

## 2025-04-04 VITALS
TEMPERATURE: 98 F | RESPIRATION RATE: 16 BRPM | OXYGEN SATURATION: 96 % | HEART RATE: 65 BPM | HEIGHT: 69 IN | SYSTOLIC BLOOD PRESSURE: 119 MMHG | WEIGHT: 165 LBS | BODY MASS INDEX: 24.44 KG/M2 | DIASTOLIC BLOOD PRESSURE: 81 MMHG

## 2025-04-04 DIAGNOSIS — M54.6 CHRONIC MIDLINE THORACIC BACK PAIN: Primary | ICD-10-CM

## 2025-04-04 DIAGNOSIS — G89.29 CHRONIC MIDLINE THORACIC BACK PAIN: Primary | ICD-10-CM

## 2025-04-04 LAB — D DIMER PPP FEU-MCNC: 0.32 UG/ML FEU (ref ?–0.5)

## 2025-04-04 PROCEDURE — 99284 EMERGENCY DEPT VISIT MOD MDM: CPT

## 2025-04-04 PROCEDURE — 71046 X-RAY EXAM CHEST 2 VIEWS: CPT | Performed by: EMERGENCY MEDICINE

## 2025-04-04 PROCEDURE — 85379 FIBRIN DEGRADATION QUANT: CPT | Performed by: EMERGENCY MEDICINE

## 2025-04-04 PROCEDURE — 36415 COLL VENOUS BLD VENIPUNCTURE: CPT

## 2025-04-04 RX ORDER — CYCLOBENZAPRINE HCL 10 MG
10 TABLET ORAL 2 TIMES DAILY PRN
Qty: 14 TABLET | Refills: 0 | Status: SHIPPED | OUTPATIENT
Start: 2025-04-04 | End: 2025-04-11

## 2025-04-04 RX ORDER — LIDOCAINE 50 MG/G
1 PATCH TOPICAL DAILY PRN
Qty: 14 PATCH | Refills: 0 | Status: SHIPPED | OUTPATIENT
Start: 2025-04-04

## 2025-04-04 RX ORDER — ACETAMINOPHEN 325 MG/1
650 TABLET ORAL EVERY 6 HOURS PRN
Qty: 30 TABLET | Refills: 0 | Status: SHIPPED | OUTPATIENT
Start: 2025-04-04

## 2025-04-04 RX ORDER — IBUPROFEN 600 MG/1
600 TABLET, FILM COATED ORAL EVERY 6 HOURS PRN
Qty: 28 TABLET | Refills: 0 | Status: SHIPPED | OUTPATIENT
Start: 2025-04-04 | End: 2025-04-11

## 2025-04-04 NOTE — DISCHARGE INSTRUCTIONS
Thank you for seeking care at [facility] Emergency [facility].  You have been seen and evaluated for back pain.  Your chest x-ray did not show any pneumonia, bony lesions or other acute findings.  Your D-dimer test was normal.      We discussed the results of your workup   Please read the instructions provided   If given prescriptions, take as instructed    Remember, your care process does not end after your visit today. Please follow-up with your doctor within 1-2 days for a follow-up check to ensure you are  improving, to see if you need any further evaluation/testing, or to evaluate for any alternate diagnoses.    Please return to the emergency department if you develop chest pain, passing out, shortness of breath, severe pain that is not controlled by pain medications, loss of control of your legs, if you are unable to walk because of pain or weakness, worsening numbness or weakness, loss of bowel or bladder control (dribbling of urine or having accidents you wouldn't normally have), inability to urinate, numbness of your genital or anal area, fevers, abdominal pain, change in urination, or as these could all be signs of a serious medical emergency. Call or return to the ER for any other new or worsening symptoms.

## 2025-04-04 NOTE — ED PROVIDER NOTES
[facility] Note  Patient: Hola Hernandez Jr. Age: 50 year old Sex: male      MRN: [id number]  : 1975    Patient Seen in: [facility] Emergency Department    History     Chief Complaint   Patient presents with    Back Pain     Stated Complaint: Back pain    History obtained from: patient     50-year-old male here with complaint of intermittent upper back pain between his shoulder blades and concern he could have a blood clot in his lungs.  He has hx of asthma.  Patient reports over the past 2 months he has been intermittently experiencing pain between his shoulder blades primarily when he coughs or laughs at times.  Pain is not constant.  He denies any pain radiating into his chest, chest tightness, shortness of breath, lightheadedness or syncope.  Denies numbness weakness or tingling in his extremities.  Denies any recent trauma injury or fall nor injury prior to the onset of the symptoms.  Denies any bowel or bladder incontinence or retention, no saddle anesthesias.  No cough or fever recently. States that he was looking up his symptoms and was told that he could possibly have a blood clot in his lungs which prompted him to come to the ER.  Denies leg swelling or calf pain.  No history of DVT or PE.  No family history of DVT or PE.  No recent surgeries or hospitalizations.  Did drive to and from [place] last weekend. No hormone use.  Not currently in any pain.     Review of Systems:  Review of Systems  Positive for stated complaint: Back pain. Constitutional and vital signs reviewed. All other systems reviewed and negative except as noted above.    Patient History:  Past Medical History:    Asthma (HCC)       Past Surgical History:   Procedure Laterality Date    Hernia surgery          No family history on file.    Specific Social Determinants of Health:   Social History     Socioeconomic History    Marital status:    Tobacco Use    Smoking status: Never    Smokeless tobacco:  Never   Vaping Use    Vaping status: Never Used   Substance and Sexual Activity    Alcohol use: No    Drug use: No   Other Topics Concern    Right Handed Yes           PSFH elements reviewed from today and agreed except as otherwise stated in HPI.    Physical Exam     ED Triage Vitals   BP 04/04/25 0358 135/88   Pulse 04/04/25 0358 65   Resp 04/04/25 0358 17   Temp 04/04/25 0358 97.5 °F (36.4 °C)   Temp src 04/04/25 0358 Oral   SpO2 04/04/25 0358 98 %   O2 Device 04/04/25 0415 None (Room air)       Current:/81   Pulse 65   Temp 97.5 °F (36.4 °C) (Oral)   Resp 16   Ht 175.3 cm (5' 9\")   Wt 74.8 kg   SpO2 96%   BMI 24.37 kg/m²         Physical Exam  Vitals and nursing note reviewed.   Constitutional:       General: He is not in acute distress.     Appearance: He is not ill-appearing.   HENT:      Head: Normocephalic and atraumatic.      Mouth/Throat:      Pharynx: Oropharynx is clear.   Eyes:      Conjunctiva/sclera: Conjunctivae normal.   Cardiovascular:      Rate and Rhythm: Normal rate and regular rhythm.      Heart sounds: No murmur heard.  Pulmonary:      Effort: Pulmonary effort is normal. No respiratory distress.      Breath sounds: No stridor. No wheezing, rhonchi or rales.   Abdominal:      General: There is no distension.      Palpations: Abdomen is soft.      Tenderness: There is no abdominal tenderness. There is no guarding or rebound.   Musculoskeletal:      Cervical back: Normal range of motion and neck supple. No tenderness.      Right lower leg: No edema.      Left lower leg: No edema.      Comments: No midline c/t/l spine ttp, no stepoff nor deformity. No paraspinal or scapular tenderness throughout.    Skin:     General: Skin is warm and dry.      Capillary Refill: Capillary refill takes less than 2 seconds.   Neurological:      General: No focal deficit present.      Mental Status: He is alert.      Comments: Strength is 5/5 bilateral upper extremities on handgrip, elbow  flexion/extension, shoulder lateral abduction/adduction, bilateral lower extremities on hip flexion, knee flexion/extension and ankle plantar/dorsiflexion. SILT bilat UE and LE throughout and symmetric. 2+ patellar and triceps reflexes bilaterally          ED Course   Labs:   Labs Reviewed   D-DIMER - Normal     Radiology findings:    No results found.    Cardiac Monitor: Interpreted by me.   Pulse Readings from Last 1 Encounters:   04/04/25 65   , sinus,       MDM   This patient presents with intermittent upper back pain ongoing for months, worse with coughing at times, though denies SOB, CP, fever or other associated symptoms. Exam is as above. He has normal vital signs, normal distal pulses, no reproducible tenderness, normal cardiopulmonary exam, neurovascular exam intact.     Differential diagnosis includes musculoskeletal strain, pleurisy, possibly underlying muscle spasm or thoracic DJD, clinically I have extremely low suspicion for SEA, cauda equina or conus medullaris syndrome or other spinal cord compression syndrome, aortic dissection, or AAA. My suspicion for PE is low, pt is adamant he is very concerned would like to rule this out today given his recent drive to/from [place] I feel a d-dimer is reasonable.     Plan: will obtain CXR 2 view imaging to eval lungs, ribs and thoracic spine, obtain d-dimer, administer multi-modal pain control medicines for further disposition. Pt declines pain meds at this time.     ED Course as of 04/04/25 0531  ------------------------------------------------------------  Time: 04/04 0504  Value: D-Dimer: 0.32  Comment: D-dimer wnl   ------------------------------------------------------------  Time: 04/04 0504  Value: XR CHEST PA + LAT CHEST (CPT=71046)  Comment:   XR chest 2 views        IMPRESSION:    Comparison: 2/2/2018.    No acute intrathoracic process.  Mild focus of linear scarring in the right midlung, unchanged.  Updated patient with results, questions answered.   Advise close follow-up with his primary doctor as an outpatient and also gave information for neurosurgery given his chronic back discomfort.  Advised on supportive care measures including Motrin, Tylenol, lidocaine patches, as needed Flexeril, and counseled him if he does have chest pain, shortness of breath, syncope,  Leg swelling, numbness or tingling or weakness he should return immediately to this or any other ER for reassessment.  He verbalized understanding and is comfortable with the discharge plan at this time            Procedures:  Procedures      Disposition and Plan     Clinical Impression:  1. Chronic midline thoracic back pain        Disposition:  Discharge    Follow-up:  Westley Quintero, DO  [address]  [address]  [facility] [address] [phone]    Schedule an appointment as soon as possible for a visit in 2 day(s)      Kailash Richter MD  [address] [facility] [facility] [address] [phone]    Schedule an appointment as soon as possible for a visit in 1 week(s)  As needed if you need a new primary doctor    [facility] Emergency Department  [address] [facility] [address] [phone]  Go to  If symptoms worsen, immediately    Javon Lopez MD  [address]  [facility] [address] [phone]    Schedule an appointment as soon as possible for a visit in 1 week(s)  As needed if symptoms not improving      Medications Prescribed:  Discharge Medication List as of 4/4/2025  5:09 AM        START taking these medications    Details   lidocaine 5 % External Patch Place 1 patch onto the skin daily as needed (back pain)., Normal, Disp-14 patch, R-0      !! cyclobenzaprine 10 MG Oral Tab Take 1 tablet (10 mg total) by mouth 2 (two) times daily as needed., Normal, Disp-14 tablet, R-0      ibuprofen 600 MG Oral Tab Take 1 tablet (600 mg total) by mouth every 6 (six) hours as needed., Normal, Disp-28 tablet, R-0      acetaminophen (TYLENOL) 325 MG Oral Tab Take 2  tablets (650 mg total) by mouth every 6 (six) hours as needed., Normal, Disp-30 tablet, R-0       !! - Potential duplicate medications found. Please discuss with provider.            This note may have been created using voice dictation technology and may include inadvertent errors.      Miranda Ash, DO  Attending Physician   Emergency Medicine

## 2025-04-04 NOTE — ED QUICK NOTES
Pt denies any pain unless he coughs and he does that to see if pain is still present upon coughing.

## 2025-04-04 NOTE — ED INITIAL ASSESSMENT (HPI)
Patient to ED for complaint of back pain in between shoulders. Patient states it's been ongoing for about a month and he does work out often and isn't sure if it's a pulled muscle. Pt also concerned for blood clot, no history, non smoker. Pt AO 4

## 2025-05-01 ENCOUNTER — OFFICE VISIT (OUTPATIENT)
Dept: NEUROLOGY | Facility: CLINIC | Age: 50
End: 2025-05-01
Payer: MEDICAID

## 2025-05-01 DIAGNOSIS — F07.81 POST CONCUSSION SYNDROME: ICD-10-CM

## 2025-05-01 DIAGNOSIS — G44.321 INTRACTABLE CHRONIC POST-TRAUMATIC HEADACHE: Primary | ICD-10-CM

## 2025-05-01 PROCEDURE — 99213 OFFICE O/P EST LOW 20 MIN: CPT | Performed by: OTHER

## 2025-05-01 RX ORDER — TOPIRAMATE 25 MG/1
TABLET, FILM COATED ORAL
Qty: 540 TABLET | Refills: 3 | Status: SHIPPED | OUTPATIENT
Start: 2025-05-01

## 2025-05-01 NOTE — PROGRESS NOTES
The following individual(s) verbally consented to be recorded using ambient AI listening technology and understand that they can each withdraw their consent to this listening technology at any point by asking the clinician to turn off or pause the recording:    Patient name: Hola Hernandez   Additional names:
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
## 2025-07-16 ENCOUNTER — OFFICE VISIT (OUTPATIENT)
Dept: FAMILY MEDICINE CLINIC | Facility: CLINIC | Age: 50
End: 2025-07-16
Payer: MEDICAID

## 2025-07-16 VITALS
HEART RATE: 74 BPM | SYSTOLIC BLOOD PRESSURE: 109 MMHG | RESPIRATION RATE: 18 BRPM | HEIGHT: 69 IN | DIASTOLIC BLOOD PRESSURE: 69 MMHG | TEMPERATURE: 98 F | WEIGHT: 183 LBS | BODY MASS INDEX: 27.11 KG/M2 | OXYGEN SATURATION: 98 %

## 2025-07-16 DIAGNOSIS — Z28.21 TETANUS, DIPHTHERIA, AND ACELLULAR PERTUSSIS (TDAP) VACCINATION DECLINED: ICD-10-CM

## 2025-07-16 DIAGNOSIS — Z28.21 PNEUMOCOCCAL VACCINATION DECLINED BY PATIENT: ICD-10-CM

## 2025-07-16 DIAGNOSIS — Z00.00 ROUTINE PHYSICAL EXAMINATION: ICD-10-CM

## 2025-07-16 DIAGNOSIS — Z11.3 ROUTINE SCREENING FOR STI (SEXUALLY TRANSMITTED INFECTION): ICD-10-CM

## 2025-07-16 DIAGNOSIS — Z12.11 COLON CANCER SCREENING: Primary | ICD-10-CM

## 2025-07-16 LAB
ALBUMIN SERPL-MCNC: 4.7 G/DL (ref 3.2–4.8)
ALBUMIN/GLOB SERPL: 1.7 {RATIO} (ref 1–2)
ALP LIVER SERPL-CCNC: 68 U/L (ref 45–117)
ALT SERPL-CCNC: 16 U/L (ref 10–49)
ANION GAP SERPL CALC-SCNC: 12 MMOL/L (ref 0–18)
AST SERPL-CCNC: 20 U/L (ref ?–34)
BASOPHILS # BLD AUTO: 0.03 X10(3) UL (ref 0–0.2)
BASOPHILS NFR BLD AUTO: 0.6 %
BILIRUB SERPL-MCNC: 0.4 MG/DL (ref 0.3–1.2)
BILIRUB UR QL: NEGATIVE
BUN BLD-MCNC: 16 MG/DL (ref 9–23)
BUN/CREAT SERPL: 14 (ref 10–20)
CALCIUM BLD-MCNC: 9.6 MG/DL (ref 8.7–10.4)
CHLORIDE SERPL-SCNC: 102 MMOL/L (ref 98–112)
CHOLEST SERPL-MCNC: 227 MG/DL (ref ?–200)
CLARITY UR: CLEAR
CO2 SERPL-SCNC: 28 MMOL/L (ref 21–32)
COLOR UR: YELLOW
COMPLEXED PSA SERPL-MCNC: 0.83 NG/ML (ref ?–4)
CREAT BLD-MCNC: 1.14 MG/DL (ref 0.7–1.3)
DEPRECATED RDW RBC AUTO: 45.3 FL (ref 35.1–46.3)
EGFRCR SERPLBLD CKD-EPI 2021: 79 ML/MIN/1.73M2 (ref 60–?)
EOSINOPHIL # BLD AUTO: 0.15 X10(3) UL (ref 0–0.7)
EOSINOPHIL NFR BLD AUTO: 3.1 %
ERYTHROCYTE [DISTWIDTH] IN BLOOD BY AUTOMATED COUNT: 14 % (ref 11–15)
FASTING PATIENT LIPID ANSWER: NO
FASTING STATUS PATIENT QL REPORTED: NO
GLOBULIN PLAS-MCNC: 2.7 G/DL (ref 2–3.5)
GLUCOSE BLD-MCNC: 85 MG/DL (ref 70–99)
GLUCOSE UR-MCNC: NORMAL MG/DL
HAV AB SER QL IA: NONREACTIVE
HBV CORE AB SERPL QL IA: NONREACTIVE
HBV SURFACE AB SER QL: NONREACTIVE
HBV SURFACE AB SERPL IA-ACNC: <3.1 MIU/ML
HBV SURFACE AG SERPL QL IA: NONREACTIVE
HCT VFR BLD AUTO: 45.1 % (ref 39–53)
HCV AB SERPL QL IA: NONREACTIVE
HDLC SERPL-MCNC: 61 MG/DL (ref 40–59)
HGB BLD-MCNC: 14.9 G/DL (ref 13–17.5)
HGB UR QL STRIP.AUTO: NEGATIVE
IMM GRANULOCYTES # BLD AUTO: 0.01 X10(3) UL (ref 0–1)
IMM GRANULOCYTES NFR BLD: 0.2 %
KETONES UR-MCNC: NEGATIVE MG/DL
LDLC SERPL CALC-MCNC: 150 MG/DL (ref ?–100)
LEUKOCYTE ESTERASE UR QL STRIP.AUTO: NEGATIVE
LYMPHOCYTES # BLD AUTO: 2.16 X10(3) UL (ref 1–4)
LYMPHOCYTES NFR BLD AUTO: 44.4 %
MCH RBC QN AUTO: 29.2 PG (ref 26–34)
MCHC RBC AUTO-ENTMCNC: 33 G/DL (ref 31–37)
MCV RBC AUTO: 88.4 FL (ref 80–100)
MONOCYTES # BLD AUTO: 0.33 X10(3) UL (ref 0.1–1)
MONOCYTES NFR BLD AUTO: 6.8 %
NEUTROPHILS # BLD AUTO: 2.19 X10 (3) UL (ref 1.5–7.7)
NEUTROPHILS # BLD AUTO: 2.19 X10(3) UL (ref 1.5–7.7)
NEUTROPHILS NFR BLD AUTO: 44.9 %
NITRITE UR QL STRIP.AUTO: NEGATIVE
NONHDLC SERPL-MCNC: 166 MG/DL (ref ?–130)
OSMOLALITY SERPL CALC.SUM OF ELEC: 294 MOSM/KG (ref 275–295)
PH UR: 5.5 [PH] (ref 5–8)
PLATELET # BLD AUTO: 288 10(3)UL (ref 150–450)
POTASSIUM SERPL-SCNC: 4 MMOL/L (ref 3.5–5.1)
PROT SERPL-MCNC: 7.4 G/DL (ref 5.7–8.2)
PROT UR-MCNC: NEGATIVE MG/DL
RBC # BLD AUTO: 5.1 X10(6)UL (ref 4.3–5.7)
SODIUM SERPL-SCNC: 142 MMOL/L (ref 136–145)
SP GR UR STRIP: 1.03 (ref 1–1.03)
T PALLIDUM AB SER QL IA: NONREACTIVE
TRIGL SERPL-MCNC: 89 MG/DL (ref 30–149)
TSI SER-ACNC: 0.94 UIU/ML (ref 0.55–4.78)
UROBILINOGEN UR STRIP-ACNC: NORMAL
VLDLC SERPL CALC-MCNC: 17 MG/DL (ref 0–30)
WBC # BLD AUTO: 4.9 X10(3) UL (ref 4–11)

## 2025-07-16 PROCEDURE — 99396 PREV VISIT EST AGE 40-64: CPT | Performed by: FAMILY MEDICINE

## 2025-07-16 NOTE — PROGRESS NOTES
Subjective:     Patient ID: Hola Hernandez Jr. is a 50 year old male.    This patient has a 50-year-old -American gentleman with a history of migraines/history of well-controlled asthma here for complete preventive care physical and for status update on any confirmed chronic medical illnesses and follow up on any previous labs or procedures that were suggestive or in need of further work up. Colonoscopy is due. Bowel, bladder, and sexual functions are intact.    Patient request STI screening.  Asymptomatic.  No known contacts.    Pneumococcal and Tdap vaccines offered.    Patient is currently asymptomatic with regards to asthma and has not had to use any rescue inhalers with any alarming frequency.              History/Other:   Review of Systems  Current Medications[1]  Allergies:Allergies[2]    Past Medical History[3]   Past Surgical History[4]   Family History[5]   Social History: Short Social Hx on File[6]     Objective:   Vitals:    07/16/25 0918   BP: 109/69   Pulse: 74   Resp: 18   Temp: 97.8 °F (36.6 °C)       Physical Exam  Constitutional:       General: He is not in acute distress.     Appearance: Normal appearance. He is not ill-appearing.   HENT:      Right Ear: Tympanic membrane normal.      Left Ear: Tympanic membrane normal.      Nose: Nose normal.      Mouth/Throat:      Mouth: Mucous membranes are moist.   Neck:      Thyroid: No thyromegaly.   Cardiovascular:      Rate and Rhythm: Normal rate and regular rhythm.      Heart sounds: Normal heart sounds.   Pulmonary:      Effort: Pulmonary effort is normal.      Breath sounds: Normal breath sounds.   Abdominal:      General: Bowel sounds are normal.      Palpations: Abdomen is soft. There is no mass.   Neurological:      General: No focal deficit present.      Mental Status: He is alert and oriented to person, place, and time.   Psychiatric:         Mood and Affect: Mood normal.         Assessment & Plan:   1. Routine physical examination  Well  exam and the following has been ordered.  - CBC W Differential W Platelet [E]; Future  - Comp Metabolic Panel (14) [E]; Future  - Lipid Panel [E]; Future  - TSH W Reflex To Free T4 [E]; Future  - Urinalysis, Routine [E]; Future    2. Colon cancer screening  Referred.  - Gastro Referral - [facility] ([place])    3. Routine screening for STI (sexually transmitted infection)  Ordered.  - HIV AG AB Combo [E]; Future  - T Pallidum Screening Likely TREP [E]; Future  - Chlamydia/Gc Amplification [E]; Future  - Hepatitis A B + C profile [E]; Future  - PSA Total, Screen; Future    4. Tetanus, diphtheria, and acellular pertussis (Tdap) vaccination declined  Declined.  - TETANUS, DIPHTHERIA TOXOIDS AND ACELLULAR PERTUSIS VACCINE (TDAP), >7 YEARS, IM USE    5. Pneumococcal vaccination declined by patient  Declined.  - Prevnar 20 (PCV20) [63058]      No orders of the defined types were placed in this encounter.      Meds This Visit:  Requested Prescriptions      No prescriptions requested or ordered in this encounter       Imaging & Referrals:  TETANUS, DIPHTHERIA TOXOIDS AND ACELLULAR PERTUSIS VACCINE (TDAP), >7 YEARS, IM USE  PCV20 VACCINE FOR INTRAMUSCULAR USE     Patient Instructions   All adult screening ordered and done appropriate for patient's age and gender and risk factors and complaints.  Patient counseled on the importance of abstinence and if sex occurs of any type condoms should be used every single time. The reality of unwanted pregnancy and all STD including HIV were emphasized.  Encouraged physical fitness and daily physical activity daily.  Monitor blood pressures and record at home. Limit salt intake.    Return in about 1 year (around 7/16/2026), or if symptoms worsen or fail to improve.         [1]   Current Outpatient Medications   Medication Sig Dispense Refill    topiramate 25 MG Oral Tab 3 pills twice a day 540 tablet 3    lidocaine 5 % External Patch Place 1 patch onto the skin daily as needed (back  pain). 14 patch 0    acetaminophen (TYLENOL) 325 MG Oral Tab Take 2 tablets (650 mg total) by mouth every 6 (six) hours as needed. 30 tablet 0    albuterol 108 (90 Base) MCG/ACT Inhalation Aero Soln Inhale 2 puffs into the lungs every 6 (six) hours as needed for Wheezing. 2 each 2    Meloxicam 15 MG Oral Tab Take 1 tablet (15 mg total) by mouth daily. 30 tablet 3    cyclobenzaprine 5 MG Oral Tab Take 1 tablet (5 mg total) by mouth nightly. 30 tablet 1    Rizatriptan Benzoate 10 MG Oral Tab use at onset; may repeat once after 2 hours- ONLY 2 IN 24 HOUR PERIOD MAX.  This is a 30 day supply. 12 tablet 5   [2] No Known Allergies  [3]   Past Medical History:   Asthma (HCC)   [4]   Past Surgical History:  Procedure Laterality Date    Hernia surgery     [5] No family history on file.  [6]   Social History  Socioeconomic History    Marital status:    Tobacco Use    Smoking status: Never    Smokeless tobacco: Never   Vaping Use    Vaping status: Never Used   Substance and Sexual Activity    Alcohol use: No    Drug use: No   Other Topics Concern    Right Handed Yes

## 2025-07-16 NOTE — PATIENT INSTRUCTIONS
All adult screening ordered and done appropriate for patient's age and gender and risk factors and complaints.  Patient counseled on the importance of abstinence and if sex occurs of any type condoms should be used every single time. The reality of unwanted pregnancy and all STD including HIV were emphasized.  Encouraged physical fitness and daily physical activity daily.  Monitor blood pressures and record at home. Limit salt intake.

## 2025-07-17 LAB
C TRACH DNA SPEC QL NAA+PROBE: NEGATIVE
N GONORRHOEA DNA SPEC QL NAA+PROBE: NEGATIVE

## (undated) NOTE — LETTER
9/18/2024          Hola Hernandez Jr.        [address]        [facility] [address]         Dear Hola,    This letter is to inform you that our office has made several attempts to reach you by phone without success.  We were attempting to contact you by phone regarding medical equipment.    Please contact our office at the number listed below as soon as you receive this letter to discuss this issue and to make the necessary changes in our system to your contact information.  Thank you for your cooperation.        Sincerely,    Westley Quintero, DO  [address] [place] [id number]  Ph: [phone]  Fax: [phone]         Document electronically generated by:  Radha CHANDLER CMA

## (undated) NOTE — LETTER
Patient Name: Makayla Corbett  YOB: 1975          MRN :  [id number]  Date:  8/30/2022  Referring Physician:  Jenna Danielson     FINAL THERAPY SESSION NOTE AND DISCHARGE SUMMARY    Diagnosis: post concussion syndrome  Authorized # of Visits: 6        Precautions: Covid PPE          Subjective:  Pt was seen for an initial evaluation on 6/3/22 followed by 5 therapy sessions for a total of 6 visits. Overall patient notes improvement. He is back to work full time. He feels his cognitive status is at 80%; he is 80% back to normal.  He still cannot work as long as he could before his concussion without taking a break. After 6 hours he starts to get fatigued. Previously he would be able to work for 12 hours. Pt still feels he needs to write things down because his recall is not as good as it was previously. He is having less headaches. Objective:      Date: 6/7/2022  Tx#: 2/6 Date: 6/21/22  Tx#: 3/6 Date: 7/15/22  Tx#: 4/6 Date: 7/22/22  Tx#: 5/6 Date: 8/30/22  Tx#: 6/6   Antonyms/  synonyms Antonyms 90% w/o delay  Synonyms 90% w/o delay  Gave HEP for adding member to category. Generative naming 4-6 items/min    Generative naming 13/min. Improved organization. Animals 15/min  Initial M words: 8/min   V/E complex explanations Gave HEP to generate presentation related to work. Pt with significant difficulty. Pt's language is tangential and disorganized. Pt's expressive language more organized. Less tangential language. Generated outline of presentation to students. Mild-mod cues required. Occasional pausing, but otherwise WNL. Memory strategies Presented strategies and discussed and gave examples of each. Reviewed memory strategies of visualization and repetition. Cued patient to use for recalling paragraphs. Pt recalled only use of writing things down which is what he primarily uses to aid his memory.    Recall and retell paragraphs  Short 3-4 sentences using visualization 75%  Medium length, 4-5 sentences, 40%  80%  When listened to story a second time, increased to 90% 11/18 for CLQT task, which is same as initial evaluation. Recall list of 5 items 2/5 after 5 min delay Gave HEP for recalling 4 items immediately using repetition. 100% during MoCA re-assessment   Exec fxn/organizational task   Deduction puzzle - mod cues required to systematically address cues. Divided attention mapping task -   mild cues Deduction puzzles  Several different types completed. All required mod assist.    Generating schedule based on clues  Mod cues required. CLQT design generation improved from 2 to 9 designs. Assessment: Portions of CLQT and MoCA re-administered. Improvements noted below. Pt still with some subtle difficulties with organization/executive function and memory. Pt understands he needs to tackle tasks strategically, completing one step at a time. He is able to write out all steps and break down larger tasks to smaller ones. Reviewed memory strategies and provided another reference handout for patient to take home. Pt is primarily using writing to aid his memory as well as to breakdown more difficult tasks into smaller segments. Patient's expressive language has improved. He is able to explain with minimal pauses to gather thoughts and for word recall. See additional objective information below for each therapy goal and for test results. MoCA  6/7/22 8/30/22  Exec fxn: 4/5  5/5  Naming 3/3  3/3   Attention: 6/6 6/6  Language: 2/3  2/3  Abstraction:  2/2  2/2  Memory: 2/5 5/5  Orientation: 5/6 6/6  Total:  24/30 28/30    CLQT scores: INITIAL  8/30/22  Attention:  196, WNL  Memory:   153,   163  Executive fXn:   25,   32    Language:   30,  30  Visuospatial:   90,     Goals: (to be met in 10 visits)   1. Pt will state 3 word recall strategies and use them in structured tasks with 90% accuracy and in spontaneous language with 80% accuracy. Not met.   Pt predominately uses writing to remember. 2.  Pt will name antonyms, synonyms and describe words and complete responsive naming tasks with 90% accuracy. Goal met. Less pausing due to word recall challenges in spontaneous language. 3.  Pt will improve generative naming to 20/minute for concrete categories and 12 for abstract or initial letter categories. Not met. Pt generates 15/min for concrete categories. 4.  Pt will generate complex explanations with 95% accuracy. Goal met. Pt able to express himself with only occasional pauses. 5.  Pt will give presentation on work related topics with 90% accuracy without obvious hesitations, revisions, word errors. Pt did not complete this task in therapy. He reports no difficulty doing this while working. 6.  Pt will name 4 memory strategies and give examples on when to use each with 90% accuracy. Not met. Pt uses writing for main recall strategy. 7.  Pt will use strategies to recall paragraph with 90% accuracy. Not met. 8.  Pt will participate in further assessment of memory and other cognitive tasks as warranted. Goal met. 9.  Pt will recall 5 unrelated words, parts of message or pieces of information with 90% accuracy after 5 minute delay. Goal met. Recalled 5 unrelated words with 100% accuracy without use of strategies. 10.  Pt will complete executive function tasks with min cues with 100% accuracy. Goal met. Design generation task from CLQT increased from 2 to 9. Plan: Discharge from therapy at this time. Skilled Services: cognitive communication speech therapy    Charges: [id number]         Total Treatment Time: 48 min    Brandon Ragsdale MA/CCC-SLP  Speech Language Pathologist  [address] [facility]  [phone]                [address] Notice to Patient: Medical documents like this are made available to patients in the interest of transparency.  However, be advised this is a medical document and it is intended as [id number] communication between your medical providers. This medical document may contain abbreviations, assessments, medical data, and results or other terms that are unfamiliar. Medical documents are intended to carry relevant information, facts as evident, and the clinical opinion of the practitioner. As such, this medical document may be written in language that appears blunt or direct. You are encouraged to contact your medical provider and/or [facility] Patient Experience if you have any questions about this medical document.

## (undated) NOTE — LETTER
21      Patient: Harinder Lomax  : 1975 Visit date: 2021    Dear Jamal Matthews,      I examined your patient in consultation today.     He has intermittent swelling, stiffness, and pain of the proximal inner phalangeal joint of the
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
## (undated) NOTE — LETTER
05/01/25        Hola Hernandez Jr.    To Whom It May Concern:        I am writing to inform you that this patient was examined at our office on May 1, 2025. The patient is currently under my care and is receiving ongoing treatments to address their medical needs.       Regarding the patient's work status, I recommend that they may return to work with restrictions No driving at night time.        Should you require any additional support or information from our office, please do not hesitate to reach out to us.        Sincerely,          Demian Biggs M.D.  We are grateful for your decision to choose   [facility] for your medical care.

## (undated) NOTE — LETTER
ASTHMA ACTION PLAN for Hola Hernandez Jr.     : 1975     Date: 25  Doctor:  Westley Quintero DO  Phone for doctor or clinic: [facility], [address] [address] [phone]      ACT Score: 25    ACT Goal: 20 or greater    Call your provider if you require your rescue/quick reliever medication more than 2-3 times in a 24 hour period.    If you require your rescue inhaler/medication more than 2-3 times weekly, your asthma may not be under proper control and you should seek medical attention.    *Quick Relievers are Xopenex and Albuterol*    You can use the colors of a traffic light to help learn about your asthma medicines.  Therapy Range       1. Green - Go! % of Personal Best Peak Flow   Use controller medicine.   Breathing is good  No cough or wheeze  Can work and play Medicine How much to take When to take it    Medications       Sympathomimetics Instructions     albuterol 108 (90 Base) MCG/ACT Inhalation Aero Soln Inhale 2 puffs into the lungs every 6 (six) hours as needed for Wheezing.                    2. Yellow - Caution. 50-79% Personal Best Peak Flow  Use reliever medicine to keep an asthma attack from getting bad.   Cough  Quick Relievers  Wheezing  Tight Chest  Wake up at night Medicine How much to take When to take it    If symptoms are not improving in 24-48 hrs, call office for further instructions  Medications       Sympathomimetics Instructions     albuterol 108 (90 Base) MCG/ACT Inhalation Aero Soln Inhale 2 puffs into the lungs every 6 (six) hours as needed for Wheezing.                    3. Red - Stop! Danger! <50% Personal Best Peak Flow  Continue Controller Medications But ADD:   Medicine not helping  Breathing is hard and fast  Nose opens wide  Can't walk  Ribs show  Can't talk well Medicine How much to take When to take it    If your symptoms do not improve in ONE hour -  go to the emergency room or call [phone]  immediately! If symptoms improve, call office for appointment immediately.    Albuterol inhaler 2 puffs every 20 minutes for three treatments       Don't forget:  Rinse mouth after using inhaler  Use spacer for inhaler  Remember to get your Flu vaccine every fall!    [x] Asthma Action Plan reviewed with the caregiver and patient, and a copy of the plan was given to the patient/caregiver.   [] Asthma Action Plan reviewed with the caregiver and patient on the phone, and copy mailed to patient/caregiver or sent via [employer].     Signatures:     Provider  Westley Quintero, DO Patient  Hola Hernandez Jr. Caretaker